# Patient Record
Sex: FEMALE | Race: WHITE | NOT HISPANIC OR LATINO | Employment: FULL TIME | URBAN - METROPOLITAN AREA
[De-identification: names, ages, dates, MRNs, and addresses within clinical notes are randomized per-mention and may not be internally consistent; named-entity substitution may affect disease eponyms.]

---

## 2018-09-10 ENCOUNTER — APPOINTMENT (OUTPATIENT)
Dept: WOMENS IMAGING | Facility: HOSPITAL | Age: 53
End: 2018-09-10

## 2018-09-10 PROCEDURE — 77067 SCR MAMMO BI INCL CAD: CPT | Performed by: RADIOLOGY

## 2020-12-10 ENCOUNTER — APPOINTMENT (OUTPATIENT)
Dept: WOMENS IMAGING | Facility: HOSPITAL | Age: 55
End: 2020-12-10

## 2020-12-10 PROCEDURE — 77067 SCR MAMMO BI INCL CAD: CPT | Performed by: RADIOLOGY

## 2021-12-14 ENCOUNTER — APPOINTMENT (OUTPATIENT)
Dept: WOMENS IMAGING | Facility: HOSPITAL | Age: 56
End: 2021-12-14

## 2021-12-14 PROCEDURE — 77067 SCR MAMMO BI INCL CAD: CPT | Performed by: RADIOLOGY

## 2022-10-05 ENCOUNTER — TRANSCRIBE ORDERS (OUTPATIENT)
Dept: CT IMAGING | Facility: CLINIC | Age: 57
End: 2022-10-05

## 2022-10-05 DIAGNOSIS — Z12.31 ENCOUNTER FOR SCREENING MAMMOGRAM FOR MALIGNANT NEOPLASM OF BREAST: Primary | ICD-10-CM

## 2022-12-16 ENCOUNTER — APPOINTMENT (OUTPATIENT)
Dept: WOMENS IMAGING | Facility: HOSPITAL | Age: 57
End: 2022-12-16

## 2022-12-16 PROCEDURE — 77067 SCR MAMMO BI INCL CAD: CPT | Performed by: RADIOLOGY

## 2022-12-30 RX ORDER — HYDROCHLOROTHIAZIDE 25 MG/1
25 TABLET ORAL EVERY MORNING
COMMUNITY
Start: 2022-10-17

## 2022-12-30 RX ORDER — LOSARTAN POTASSIUM 25 MG/1
TABLET ORAL
COMMUNITY
Start: 2022-12-02

## 2022-12-30 RX ORDER — MULTIPLE VITAMINS W/ MINERALS TAB 9MG-400MCG
1 TAB ORAL DAILY
COMMUNITY

## 2023-01-03 ENCOUNTER — OFFICE VISIT (OUTPATIENT)
Dept: BARIATRICS/WEIGHT MGMT | Facility: CLINIC | Age: 58
End: 2023-01-03
Payer: COMMERCIAL

## 2023-01-03 VITALS
HEART RATE: 76 BPM | WEIGHT: 234 LBS | DIASTOLIC BLOOD PRESSURE: 64 MMHG | HEIGHT: 66 IN | BODY MASS INDEX: 37.61 KG/M2 | OXYGEN SATURATION: 99 % | SYSTOLIC BLOOD PRESSURE: 124 MMHG

## 2023-01-03 DIAGNOSIS — F32.A DEPRESSIVE DISORDER: ICD-10-CM

## 2023-01-03 DIAGNOSIS — Z83.3 FAMILY HISTORY OF DIABETES MELLITUS IN SISTER: ICD-10-CM

## 2023-01-03 DIAGNOSIS — K21.9 GASTROESOPHAGEAL REFLUX DISEASE WITHOUT ESOPHAGITIS: ICD-10-CM

## 2023-01-03 DIAGNOSIS — K59.09 OTHER CONSTIPATION: ICD-10-CM

## 2023-01-03 DIAGNOSIS — Z51.81 ENCOUNTER FOR THERAPEUTIC DRUG LEVEL MONITORING: ICD-10-CM

## 2023-01-03 DIAGNOSIS — I10 PRIMARY HYPERTENSION: ICD-10-CM

## 2023-01-03 DIAGNOSIS — R73.03 PRE-DIABETES: ICD-10-CM

## 2023-01-03 DIAGNOSIS — G47.09 OTHER INSOMNIA: ICD-10-CM

## 2023-01-03 DIAGNOSIS — E66.01 CLASS 2 SEVERE OBESITY DUE TO EXCESS CALORIES WITH SERIOUS COMORBIDITY AND BODY MASS INDEX (BMI) OF 37.0 TO 37.9 IN ADULT: Primary | ICD-10-CM

## 2023-01-03 DIAGNOSIS — R53.83 OTHER FATIGUE: ICD-10-CM

## 2023-01-03 DIAGNOSIS — M19.90 ARTHRITIS: ICD-10-CM

## 2023-01-03 PROBLEM — E66.812 CLASS 2 SEVERE OBESITY DUE TO EXCESS CALORIES WITH SERIOUS COMORBIDITY AND BODY MASS INDEX (BMI) OF 37.0 TO 37.9 IN ADULT: Status: ACTIVE | Noted: 2023-01-03

## 2023-01-03 PROBLEM — Z72.0 TOBACCO USE: Status: ACTIVE | Noted: 2023-01-03

## 2023-01-03 PROBLEM — F41.9 ANXIETY: Status: ACTIVE | Noted: 2022-02-28

## 2023-01-03 PROCEDURE — 99205 OFFICE O/P NEW HI 60 MIN: CPT | Performed by: NURSE PRACTITIONER

## 2023-01-03 RX ORDER — PSYLLIUM SEED (WITH DEXTROSE)
1 POWDER (GRAM) ORAL DAILY
Qty: 283 G | Refills: 0
Start: 2023-01-03

## 2023-01-03 RX ORDER — CHLORAL HYDRATE 500 MG
1000 CAPSULE ORAL 2 TIMES DAILY WITH MEALS
Start: 2023-01-03

## 2023-01-03 RX ORDER — LANSOPRAZOLE 15 MG/1
15 CAPSULE, DELAYED RELEASE ORAL AS NEEDED
COMMUNITY

## 2023-01-03 NOTE — ASSESSMENT & PLAN NOTE
Worsening with weight gain. Taking prevacid and omeprazole most days of the week. Weight reduction of 5-10% should help. Continue to avoid trigger foods.

## 2023-01-03 NOTE — ASSESSMENT & PLAN NOTE
Patient's depression is recurrent and is moderate without psychosis. Their depression is currently active and the condition is newly identified. This will be reassessed at the next regular appointment. F/U as described:patient declines anti-depressant at this time.  Consider wellbutrin or prozac, tolerated well in the past and they may also help with weight reduction and appetite suppression.

## 2023-01-03 NOTE — ASSESSMENT & PLAN NOTE
Patient's (Body mass index is 37.77 kg/m².) indicates that they are morbidly/severely obese (BMI > 40 or > 35 with obesity - related health condition) with health conditions that include hypertension, GERD, osteoarthritis and pre-diabetes . Weight is improving with treatment. BMI is is above average; BMI management plan is completed. We discussed low calorie, low carb based diet program, portion control, increasing exercise, management of depression/anxiety/stress to control compensatory eating and an ligia-based approach such as Netaplan Pal or Lose It.    Topics of discussion included obesity as a disease, nutritional education on food groups, exercise, and medications. Patient was instructed in adequate protein, controlled carb and controlled fat intake.   Patient received instructions on using the medicines as a tool in controlling their weight with nutritional and behavioral changes. Risks and benefits were discussed. I believe the potential benefits of medication helping to decrease weight outweighs the risks. Patient is to try nutritonal/behavioral changes only first.   Patient received our clinic education booklet.   Our patient consent form was reviewed including potential risks of weight loss. We also reviewed our confidentiality and HIPPA statements. Patients current FITT score was reviewed along with current capability for exercise tolerance and a patient will work towards a FITT score of:     Frequency   Intensity Time Strength Training   []   0 None  []   0 None  []   0 None  []   0 None    []   1 (1-2x/week) []   1 (light) []   1 (<10 min) []   1 (1x/week)   [x]   2 (3-5x/week) [x]   2 (moderate) []   2 (10-20 min) [x]   2 (2x/week)   []   3 (daily)   []   3 (moderately hard)  []   4 (very hard) []   3 (20-30 min)  [x]   4 (>30 min) []   3 (3-4x/week)       Patient's past medical history was reviewed in detail and barriers to weight loss were identified and discussed. Past efforts at weight reduction  on their own as well as under physician supervision were documented and discussed.  I advised patient to continue routine care with their Primary Care Provider.     Nutritional recommendations and goals were reviewed including Calories:8847-0585 daily adjusted for exercise calories burnt, Protein: g daily, Net carbs (total carb - fiber) of 50-75g per day.  Start to keep a food journal and bring into next visit in 2 weeks for review. Practice the behavioral modification technique of mindful eating. Take one MVI daily and 2000mg fish oil daily. Take other medications and supplements as directed.  Start metamucil before meals.   Complete labwork.   Check AOM coverage (has state insurance so injectables saxenda and wegovy are likely affordable). Candidate for all meds, however she did not have successful weight loss with contrave in the past. Only took phentermine for 30 days for plateau.   1st treatment goal 175.

## 2023-01-03 NOTE — ASSESSMENT & PLAN NOTE
Menopause related. Difficulty getting to sleep or waking up after 4 hours. Added a nightly bath and that has helped. +snoring. Consider topamax in the future for sleep and weight loss.

## 2023-01-03 NOTE — ASSESSMENT & PLAN NOTE
Hypertension is stable with losartan and hctz.  Continue current treatment regimen.  Weight loss.  Regular aerobic exercise.  Continue current medications., managed by PCP.  Blood pressure will be reassessed at the next regular appointment.

## 2023-01-03 NOTE — PROGRESS NOTES
[facility]  [address]  [place] [address]   Office Note      Date: 2023  Patient Name: Dana Jackson  MRN: [id number]  : 1965  Subjective  Subjective     Chief Complaint  Obesity Management consult, nutrition education          Dana Jackson presents to [facility] for obesity management. She is a self-referral. Her recent diet attempts have not resulted in weight loss and she is seeking help. Her blood presssure has been increasing and she is now on two blood pressure medications that she desires to discontinue after losing weight. She is concerned that menopause is preventing her from losing weight. She is concerned about her increasing abdominal girth.   Weight history:  Goal weight 145-155lb, feels good there. Was always able to get back there until the last 2 years. Attributes recent weight gain to stopping hormone replacement therapy and stress with the loss of her father in  and her husbands cancer diagnosis in . Took contrave in  for <3 months, did not get results. Took phentemrine for 30 days for a weight plateau, can't recall any side effects. 2017 diagnosed with pre-diabetes, was started on weight watchers and was successful at losing weight.    Highest lifetime weight: 234 pounds. Today's weight is 106 kg (234 lb) pounds.   Weight 5 years ago: 220  The following seem to sabotage weight loss efforts:Lack of time for planning & self, social events, mindless eating (snacking while working or watching TV) and eating too fast  Pertinent medical history:  Total hysterectomy at age 33 after then birth of her daughter. She is now going through menopause, stopped hormone replacement in the last couple of years, BP went up and started hctz about a year ago. Her weight started to increase, she became irritable. PCP advised her to go back to weight watchers and is doing the same thing she did  previously and it didn't work. Then was started on losartan.  Weight gain has been in the midsection.  Sleep quality has declined since menopausal symptoms- difficulty getting to sleep or waking up after 4 hours. Added a nightly bath and that has helped. Has noticed difficulty getting up from the floor over then last 6 months.   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  passed away 20 years ago, originally tried prozac, no problems. Started wellbutrin and stopped a year ago. Did not tolerate cymbalta or zoloft.    Arthritis: right elbow, right knee. Was a runner, now is a walker. No exercise limitations other than high impact exercise. Occasionally takes naprosyn.   She has unifocal PVCs. No history of chest pain.    Family hisotry  Sister has type 2 insulin dependent diabetes, diagnosed 10 years ago. Also has obesity  Mother has obesity, maternal grandmother also had diabetes (had gastric bypass surgery)  Father had heart problems, had MI at age in early 50s. Had defibrillator/pacemaker placed. Had CHF on oxygen also. Passed away of COVID-19.   Review of Systems   Constitutional: Positive for fatigue and unexpected weight gain.        Positive for weight gain   HENT: Negative for trouble swallowing.         Negative for throat swelling   Respiratory: Negative for shortness of breath and wheezing.         Negative for snoring   Cardiovascular: Positive for leg swelling. Negative for chest pain and palpitations.   Gastrointestinal: Positive for abdominal pain, GERD and indigestion. Negative for constipation, diarrhea and nausea.   Endocrine: Negative for cold intolerance, heat intolerance, polydipsia, polyphagia and polyuria.        Negative for loss of hair  Negative for hirsutism     Genitourinary:        Denies menstrual irregularities   Musculoskeletal: Positive for arthralgias.        Denies exercise limitations  Denies chronic pain   Skin: Positive for dry skin.        Negative for  acne   Neurological: Negative for headache and memory problem.        Negative for paresthesias   Psychiatric/Behavioral: Positive for depressed mood. Negative for self-injury, sleep disturbance and suicidal ideas. The patient is not nervous/anxious.        PHQ-9 Total Score: 17           Objective   Body mass index is 37.77 kg/m².  Body composition analysis completed and showed:   %body fat: 50.1    Measurements (in inches)  Neck: 14.5  Chest: 45.5  Waist: 46  Hips: 51.5  Thighs: 37    Vital Signs:   /64   Pulse 76   Ht 167.6 cm (66\")   Wt 106 kg (234 lb)   SpO2 99%   BMI 37.77 kg/m²     Physical Exam   General appears stated age and normal appearance   HEENT PERRLA, EOM intact, conjunctivae normal and without thyromegaly or thyroid nodules   Chest/lungs Normal rate, Regular rhythm, Breathing is unlabored and Clear to auscultation bilaterally   Abdomen Soft, normal bowel sounds, without mass or tenderness and Central adiposity   Extremities without edema   Neuro Good historian and No focal deficit   Skin Acanthosis nigricans and Warm, dry, intact   Psych normal behavior, normal thought content and normal concentration     Result Review :                   Assessment / Plan       Diagnoses and all orders for this visit:    1. Class 2 severe obesity due to excess calories with serious comorbidity and body mass index (BMI) of 37.0 to 37.9 in adult (HCC) (Primary)  Assessment & Plan:  Patient's (Body mass index is 37.77 kg/m².) indicates that they are morbidly/severely obese (BMI > 40 or > 35 with obesity - related health condition) with health conditions that include hypertension, GERD, osteoarthritis and pre-diabetes . Weight is improving with treatment. BMI is is above average; BMI management plan is completed. We discussed low calorie, low carb based diet program, portion control, increasing exercise, management of depression/anxiety/stress to control compensatory eating and an ligia-based approach such as  MyFitness Pal or Lose It.    Topics of discussion included obesity as a disease, nutritional education on food groups, exercise, and medications. Patient was instructed in adequate protein, controlled carb and controlled fat intake.   Patient received instructions on using the medicines as a tool in controlling their weight with nutritional and behavioral changes. Risks and benefits were discussed. I believe the potential benefits of medication helping to decrease weight outweighs the risks. Patient is to try nutritonal/behavioral changes only first.   Patient received our clinic education booklet.   Our patient consent form was reviewed including potential risks of weight loss. We also reviewed our confidentiality and HIPPA statements. Patients current FITT score was reviewed along with current capability for exercise tolerance and a patient will work towards a FITT score of:     Frequency   Intensity Time Strength Training   []   0 None  []   0 None  []   0 None  []   0 None    []   1 (1-2x/week) []   1 (light) []   1 (<10 min) []   1 (1x/week)   [x]   2 (3-5x/week) [x]   2 (moderate) []   2 (10-20 min) [x]   2 (2x/week)   []   3 (daily)   []   3 (moderately hard)  []   4 (very hard) []   3 (20-30 min)  [x]   4 (>30 min) []   3 (3-4x/week)       Patient's past medical history was reviewed in detail and barriers to weight loss were identified and discussed. Past efforts at weight reduction on their own as well as under physician supervision were documented and discussed.  I advised patient to continue routine care with their Primary Care Provider.     Nutritional recommendations and goals were reviewed including Calories:1327-0024 daily adjusted for exercise calories burnt, Protein: g daily, Net carbs (total carb - fiber) of 50-75g per day.  Start to keep a food journal and bring into next visit in 2 weeks for review. Practice the behavioral modification technique of mindful eating. Take one MVI daily and  2000mg fish oil daily. Take other medications and supplements as directed.  Start metamucil before meals.   Complete labwork.   Check AOM coverage (has state insurance so injectables saxenda and wegovy are likely affordable). Candidate for all meds, however she did not have successful weight loss with contrave in the past. Only took phentermine for 30 days for plateau.   1st treatment goal 175.       Orders:  -     Psyllium (Metamucil) 28.3 % powder; Take 1 g by mouth Daily.  Dispense: 283 g; Refill: 0  -     Omega-3 Fatty Acids (fish oil) 1000 MG capsule capsule; Take 1 capsule by mouth 2 (Two) Times a Day With Meals.  -     Insulin, Total  -     Comprehensive Metabolic Panel  -     Hemoglobin A1c  -     Lipid Panel  -     TSH  -     Calcitriol (1,25 di-OH Vitamin D)    2. Gastroesophageal reflux disease without esophagitis  Assessment & Plan:  Worsening with weight gain. Taking prevacid and omeprazole most days of the week. Weight reduction of 5-10% should help. Continue to avoid trigger foods.       3. Primary hypertension  Assessment & Plan:  Hypertension is stable with losartan and hctz.  Continue current treatment regimen.  Weight loss.  Regular aerobic exercise.  Continue current medications., managed by PCP.  Blood pressure will be reassessed at the next regular appointment.      4. Pre-diabetes  Assessment & Plan:  Diagnosed in 2017. Check A1c today. Sister has type 2 diabetes.      5. Depressive disorder  Assessment & Plan:  Patient's depression is recurrent and is moderate without psychosis. Their depression is currently active and the condition is newly identified. This will be reassessed at the next regular appointment. F/U as described:patient declines anti-depressant at this time.  Consider wellbutrin or prozac, tolerated well in the past and they may also help with weight reduction and appetite suppression.      6. Arthritis  Assessment & Plan:  Weight reduction should help. Increase physical  activity.       7. Other constipation  Assessment & Plan:  Increase water, fiber exercise. May be related to calcium supplement. Add metamucil.       8. Family history of diabetes mellitus in sister  -     Insulin, Total  -     Comprehensive Metabolic Panel  -     Hemoglobin A1c    9. Other fatigue  -     TSH  -     Calcitriol (1,25 di-OH Vitamin D)    10. Encounter for therapeutic drug level monitoring  -     Urine Drug Screen - Urine, Clean Catch    11. Other insomnia  Assessment & Plan:  Menopause related. Difficulty getting to sleep or waking up after 4 hours. Added a nightly bath and that has helped. +snoring. Consider topamax in the future for sleep and weight loss.          I spent 70 minutes caring for Dana on this date of service. This time includes time spent by me in the following activities:preparing for the visit, obtaining and/or reviewing a separately obtained history, performing a medically appropriate examination and/or evaluation , counseling and educating the patient/family/caregiver, ordering medications, tests, or procedures and documenting information in the medical record  Follow Up   Return in about 2 weeks (around 1/17/2023) for Next scheduled follow up, Labs this visit.  Patient was given instructions and counseling regarding her condition or for health maintenance advice. Please see specific information pulled into the AVS if appropriate.     Radha Lezama, NAVYA  01/03/2023

## 2023-01-03 NOTE — PROGRESS NOTES
[facility]  [address]  [place] [address]   Office Note      Date: 2023  Patient Name: Dana Jackson  MRN: [id number]  : 1965  Subjective  Subjective     Chief Complaint  Obesity Management follow-up     {Problem List  Visit Diagnosis   Encounters  Notes  Medications  Labs  Result Review Imaging  Media :23}     Dana Jackson presents to [facility] for obesity management.    Highest lifetime weight: 223 pounds. Today's weight is   pounds.   Weight 5 years ago: 220  The patient is exercising with a FITT score of:    The following seem to sabotage weight loss efforts:{WeightLossInst:33749}    Review of Systems   Constitutional: Positive for fatigue and unexpected weight gain.        Positive for weight gain   HENT: Negative for trouble swallowing.         Negative for throat swelling   Respiratory: Negative for shortness of breath and wheezing.         Negative for snoring   Cardiovascular: Positive for leg swelling. Negative for chest pain and palpitations.   Gastrointestinal: Positive for abdominal pain, GERD and indigestion. Negative for constipation and diarrhea.   Endocrine: Negative for cold intolerance, heat intolerance, polydipsia, polyphagia and polyuria.        Negative for loss of hair  Negative for hirsutism     Genitourinary:        Denies menstrual irregularities   Musculoskeletal: Positive for arthralgias.        Denies exercise limitations  Denies chronic pain   Skin: Positive for dry skin.        Negative for acne   Neurological: Negative for headache and memory problem.        Negative for paresthesias   Psychiatric/Behavioral: Positive for depressed mood. Negative for self-injury, sleep disturbance and suicidal ideas. The patient is not nervous/anxious.        PHQ-9 Total Score: 17       Objective   There is no height or weight on file to calculate BMI.  Body composition analysis  completed and showed:   %body fat: ***    Measurements (in inches)  Neck: 14.5  Chest: 45.5  Waist: 46  Hips: 51.5  Thighs: 37    Vital Signs:   /64   Pulse 76   SpO2 99%     Physical Exam   General {NewPTPEGen:43801::\"appears stated age\",\"normal appearance\"}   HEENT {NewPTPEHEENT:25273::\"PERRLA\",\"EOM intact\",\"conjunctivae normal\"}   Chest/lungs {NewPTPECARDIO/PULM:10348::\"Normal rate\",\"Regular rhythm\",\"Breathing is unlabored\",\"Clear to auscultation bilaterally\"}   Abdomen {NewPTPEAbdomen:71151::\"Soft, normal bowel sounds, without mass or tenderness\"}   Extremities {NewPTPEExt:90420::\"without edema\"}   Neuro {NewPTPENeuro:69502::\"Normal DTRs\",\"Good historian\",\"No focal deficit\"}   Skin {NewPtPESkin:72889::\"Warm, dry, intact\"}   Psych {NewPTPEPsych:91751::\"normal behavior\",\"normal thought content\",\"normal concentration\"}     Result Review :{Labs  Result Review  Imaging  Med Tab  Media :23}   {The following data was reviewed by (Optional):38613}  {Ambulatory Labs (Optional):51215}  {Data reviewed (Optional):50649:::1}            Assessment / Plan    {CC Problem List  Visit Diagnosis  ROS  Review (Popup)  Health Maintenance  Quality  BestPractice  Medications  SmartSets  SnapShot Encounters  Media :23}   There are no diagnoses linked to this encounter.     {Time Spent (Optional):88288}  Follow Up {Instructions Charge Capture  Follow-up Communications :23}  No follow-ups on file.  Patient was given instructions and counseling regarding her condition or for health maintenance advice. Please see specific information pulled into the AVS if appropriate.     Radha Lezama, APRN  01/03/2023

## 2023-01-05 LAB
1,25(OH)2D SERPL-MCNC: 65.5 PG/ML (ref 24.8–81.5)
ALBUMIN SERPL-MCNC: 4.3 G/DL (ref 3.8–4.9)
ALBUMIN/GLOB SERPL: 1.9 {RATIO} (ref 1.2–2.2)
ALP SERPL-CCNC: 67 IU/L (ref 44–121)
ALT SERPL-CCNC: 48 IU/L (ref 0–32)
AMPHETAMINES UR QL SCN: NEGATIVE NG/ML
AST SERPL-CCNC: 34 IU/L (ref 0–40)
BARBITURATES UR QL SCN: NEGATIVE NG/ML
BENZODIAZ UR QL SCN: NEGATIVE NG/ML
BILIRUB SERPL-MCNC: 0.5 MG/DL (ref 0–1.2)
BUN SERPL-MCNC: 17 MG/DL (ref 6–24)
BUN/CREAT SERPL: 25 (ref 9–23)
BZE UR QL SCN: NEGATIVE NG/ML
CALCIUM SERPL-MCNC: 9.1 MG/DL (ref 8.7–10.2)
CANNABINOIDS UR QL SCN: NEGATIVE NG/ML
CHLORIDE SERPL-SCNC: 107 MMOL/L (ref 96–106)
CHOLEST SERPL-MCNC: 169 MG/DL (ref 100–199)
CO2 SERPL-SCNC: 23 MMOL/L (ref 20–29)
CREAT SERPL-MCNC: 0.69 MG/DL (ref 0.57–1)
CREAT UR-MCNC: 77.1 MG/DL (ref 20–300)
EGFRCR SERPLBLD CKD-EPI 2021: 102 ML/MIN/1.73
GLOBULIN SER CALC-MCNC: 2.3 G/DL (ref 1.5–4.5)
GLUCOSE SERPL-MCNC: 97 MG/DL (ref 70–99)
HBA1C MFR BLD: 5.7 % (ref 4.8–5.6)
HDLC SERPL-MCNC: 55 MG/DL
INSULIN SERPL-ACNC: 27.4 UIU/ML (ref 2.6–24.9)
LABORATORY COMMENT REPORT: NORMAL
LDLC SERPL CALC-MCNC: 103 MG/DL (ref 0–99)
METHADONE UR QL SCN: NEGATIVE NG/ML
OPIATES UR QL SCN: NEGATIVE NG/ML
OXYCODONE+OXYMORPHONE UR QL SCN: NEGATIVE NG/ML
PCP UR QL: NEGATIVE NG/ML
PH UR: 5.8 [PH] (ref 4.5–8.9)
POTASSIUM SERPL-SCNC: 4.6 MMOL/L (ref 3.5–5.2)
PROPOXYPH UR QL SCN: NEGATIVE NG/ML
PROT SERPL-MCNC: 6.6 G/DL (ref 6–8.5)
SODIUM SERPL-SCNC: 143 MMOL/L (ref 134–144)
TRIGL SERPL-MCNC: 55 MG/DL (ref 0–149)
TSH SERPL DL<=0.005 MIU/L-ACNC: 1.11 UIU/ML (ref 0.45–4.5)
VLDLC SERPL CALC-MCNC: 11 MG/DL (ref 5–40)

## 2023-01-12 ENCOUNTER — OFFICE VISIT (OUTPATIENT)
Dept: BARIATRICS/WEIGHT MGMT | Facility: CLINIC | Age: 58
End: 2023-01-12
Payer: COMMERCIAL

## 2023-01-12 VITALS
WEIGHT: 227 LBS | HEIGHT: 66 IN | BODY MASS INDEX: 36.48 KG/M2 | DIASTOLIC BLOOD PRESSURE: 84 MMHG | SYSTOLIC BLOOD PRESSURE: 120 MMHG | HEART RATE: 76 BPM | OXYGEN SATURATION: 100 %

## 2023-01-12 DIAGNOSIS — R73.03 PRE-DIABETES: ICD-10-CM

## 2023-01-12 DIAGNOSIS — E78.49 OTHER HYPERLIPIDEMIA: ICD-10-CM

## 2023-01-12 DIAGNOSIS — R11.0 NAUSEA: ICD-10-CM

## 2023-01-12 DIAGNOSIS — E66.01 CLASS 2 SEVERE OBESITY DUE TO EXCESS CALORIES WITH SERIOUS COMORBIDITY AND BODY MASS INDEX (BMI) OF 36.0 TO 36.9 IN ADULT: Primary | ICD-10-CM

## 2023-01-12 DIAGNOSIS — M72.2 PLANTAR FASCIITIS: ICD-10-CM

## 2023-01-12 PROBLEM — R74.01 ALT (SGPT) LEVEL RAISED: Status: ACTIVE | Noted: 2023-01-12

## 2023-01-12 PROCEDURE — 99215 OFFICE O/P EST HI 40 MIN: CPT | Performed by: NURSE PRACTITIONER

## 2023-01-12 PROCEDURE — 96372 THER/PROPH/DIAG INJ SC/IM: CPT | Performed by: NURSE PRACTITIONER

## 2023-01-12 RX ORDER — ONDANSETRON 4 MG/1
4 TABLET, ORALLY DISINTEGRATING ORAL EVERY 8 HOURS PRN
Qty: 14 TABLET | Refills: 0 | Status: SHIPPED | OUTPATIENT
Start: 2023-01-12 | End: 2023-03-23 | Stop reason: SDUPTHER

## 2023-01-12 RX ORDER — SEMAGLUTIDE 0.5 MG/.5ML
0.5 INJECTION, SOLUTION SUBCUTANEOUS WEEKLY
Qty: 2 ML | Refills: 0 | Status: SHIPPED | OUTPATIENT
Start: 2023-01-12 | End: 2023-02-13 | Stop reason: DRUGHIGH

## 2023-01-12 NOTE — ASSESSMENT & PLAN NOTE
Flared with treadmill, has switched to non weight bearing exercises. Weight reduction should help.

## 2023-01-12 NOTE — PROGRESS NOTES
"  [facility]  [address]  [place] [address]     Office Note      Date: 2023  Patient Name: Dana Jackson  MRN: [id number]  : 1965  Subjective  Subjective     Chief Complaint  Obesity Management follow-up          Dana Jackson presents to [facility] for obesity management. This is her initial 2 week follow up. She is working on the following lifestyle changes: \"everything you told me to do\". Keeping a food journal, reading nutrition labels and increasing protein, cooking leaner foods at home, increasing exercise. Patient is unsure with weight loss progress. Appetite is moderately controlled. Reports frequent BM when first starting psyllium husk but she is now tolerating it well- one scoop once a day. The patient is taking multivitamin and is taking fish oil.  The patient is using a food journal. Is eating more protein- protein shakes, salmon, chicken, eggs. The patient is exercising, plantar fasciitis flared up after 3 days on the treadmill then switched to yoga and cycling with a FITT score of:    Frequency Intensity Time Strength Training   []   0, none []   0 []   0 []   0   []   1 (1-2x/week) [x]   1 (light) []   1 (<10 min) []   1 (1x/week)   [x]   2 (3-5x/week) [x]   2 (moderate) []   2 (10-20 min) [x]   2 (2x/week)   []   3 (daily) []   3 (moderately hard)  []   4 (very hard) []   3 (20-30 min)  []   4 (>30 min) []   3 (3-4x/week)     Review of Systems   Constitutional: Negative for appetite change and fatigue.   Eyes: Negative for blurred vision, double vision and visual disturbance.   Cardiovascular: Negative for chest pain and palpitations.   Gastrointestinal: Positive for abdominal pain. Negative for constipation, diarrhea, nausea, vomiting and GERD.   Endocrine: Negative for polydipsia, polyphagia and polyuria.   Musculoskeletal: Negative for arthralgias, back pain and myalgias.   Neurological: " "Negative for dizziness, tremors, light-headedness, headache and memory problem.        Parasthesias negative   Psychiatric/Behavioral: Positive for sleep disturbance. Negative for depressed mood and stress. The patient is not nervous/anxious.        Objective   Start weight: 234 pounds.    Total Loss lb/%Loss of beginning body weight (BBW): -7lb/-2.99%  Change in weight since last visit: -7    Body mass index is 36.64 kg/m².   Body composition analysis completed and showed:   %body fat: 51.2  Measurements (in inches)  Waist: 46    Vital Signs:   /84   Pulse 76   Ht 167.6 cm (66\")   Wt 103 kg (227 lb)   SpO2 100%   BMI 36.64 kg/m²     Physical Exam   General appears stated age and normal appearance   HEENT PERRLA, EOM intact and conjunctivae normal   Chest/lungs Normal rate, Regular rhythm and Breathing is unlabored   Extremities without edema   Neuro Good historian and No focal deficit   Skin Warm, dry, intact   Psych normal behavior, normal thought content and normal concentration     Result Review :   The following data was reviewed by: NAVYA Varma on 01/12/2023:  Fasting insulin indicates insulin resistance. A1c is also in prediabetic range.   ALT slightly elevated, likely fatty liver, CMP otherwise unremarkable.  LDL cholesterol slightly elevated at 103. Weight loss should help.   TSH thyroid normal and ideal.    Vitamin D normal and ideal at 65.5.  Common labs    Common Labs 1/3/23 1/3/23 1/3/23    1019 1019 1019   Glucose 97     BUN 17     Creatinine 0.69     Sodium 143     Potassium 4.6     Chloride 107 (A)     Calcium 9.1     Total Protein 6.6     Albumin 4.3     Total Bilirubin 0.5     Alkaline Phosphatase 67     AST (SGOT) 34     ALT (SGPT) 48 (A)     Total Cholesterol   169   Triglycerides   55   HDL Cholesterol   55   LDL Cholesterol    103 (A)   Hemoglobin A1C  5.7 (A)    (A) Abnormal value       Comments are available for some flowsheets but are not being displayed.                "     Assessment / Plan        Diagnoses and all orders for this visit:    1. Class 2 severe obesity due to excess calories with serious comorbidity and body mass index (BMI) of 36.0 to 36.9 in adult (HCC) (Primary)  Assessment & Plan:  Patient's (Body mass index is 36.64 kg/m².) indicates that they are morbidly/severely obese (BMI > 40 or > 35 with obesity - related health condition) with health conditions that include hypertension, dyslipidemias, GERD and pre-diabetes . Weight is improving with lifestyle modifications. BMI is is above average; BMI management plan is completed. We discussed low calorie, low carb based diet program, portion control, increasing exercise, joining a fitness center or start home based exercise program, pharmacologic options including wegovy, psyllium husk and an ligia-based approach such as [employer] Pal or Lose It.    I have instructed the patient to continue with pursuit of medical weight loss as a part of this program. Patient does meet criteria for use of anorectics at this time as BMI >30  and is not at treatment goal.     Continue nutritional focus and work towards new exercise FITT goal of: 2-2-4-2. Continue to increase as tolerated.   The current plan for this month includes: continue to work on lifestyle behavioral changes and continue nutrition focus with daily food journal. Going on a cruise, has consulted with the ship director to ensure she can stay on low carb diet on her trip. Goal 6-8lb.   Start Wegovy. Denies family or personal history of pancreatitis, MTC, or MEN 2. Discussed common side effects of nausea, diarrhea, vomiting, constipation, stomach pain, headache, fatigue, upset stomach, dizziness, feeling bloated, belching, gas, stomach flu, heartburn.   First injection of Wegovy 0.25mg was administered in the office today LUQ, no complications. Sample pen provided to patient along with content regarding use of the pen, dosing schedule, savings opportunities, and helpful  tips.   Treatment goal 175lb.         Orders:  -     Semaglutide-Weight Management 0.25 MG/0.5ML solution auto-injector; Inject 0.25 mg under the skin into the appropriate area as directed 1 (One) Time Per Week.  Dispense: 2 mL; Refill: 0  -     Semaglutide-Weight Management (Wegovy) 0.5 MG/0.5ML solution auto-injector; Inject 0.5 mg under the skin into the appropriate area as directed 1 (One) Time Per Week.  Dispense: 2 mL; Refill: 0    2. Pre-diabetes  Assessment & Plan:  A1c 5.7. Low carb diet, regular physical activity, and weight reduction of 10-15%. Start wegovy.         3. Other hyperlipidemia  Assessment & Plan:  Lipid abnormalities are mild.  Nutritional counseling was provided. and continue psyllium husk fiber  Lipids will be reassessed in 1 year.      4. Plantar fasciitis  Assessment & Plan:  Flared with treadmill, has switched to non weight bearing exercises. Weight reduction should help.       5. Nausea  -     ondansetron ODT (ZOFRAN-ODT) 4 MG disintegrating tablet; Place 1 tablet on the tongue Every 8 (Eight) Hours As Needed for Nausea or Vomiting.  Dispense: 14 tablet; Refill: 0      I spent 40 minutes caring for Dana on this date of service. This time includes time spent by me in the following activities:preparing for the visit, reviewing tests, obtaining and/or reviewing a separately obtained history, performing a medically appropriate examination and/or evaluation , counseling and educating the patient/family/caregiver, ordering medications, tests, or procedures, documenting information in the medical record and care coordination  Follow Up   Return in about 4 weeks (around 2/9/2023) for Next scheduled follow up.  Patient was given instructions and counseling regarding her condition or for health maintenance advice. Please see specific information pulled into the AVS if appropriate.     Radha Lezama, NAVYA  01/12/2023

## 2023-01-12 NOTE — ASSESSMENT & PLAN NOTE
A1c 5.7. Low carb diet, regular physical activity, and weight reduction of 10-15%. Start wegovy.

## 2023-01-12 NOTE — ASSESSMENT & PLAN NOTE
Lipid abnormalities are mild.  Nutritional counseling was provided. and continue psyllium husk fiber  Lipids will be reassessed in 1 year.

## 2023-01-12 NOTE — ASSESSMENT & PLAN NOTE
Patient's (Body mass index is 36.64 kg/m².) indicates that they are morbidly/severely obese (BMI > 40 or > 35 with obesity - related health condition) with health conditions that include hypertension, dyslipidemias, GERD and pre-diabetes . Weight is improving with lifestyle modifications. BMI is is above average; BMI management plan is completed. We discussed low calorie, low carb based diet program, portion control, increasing exercise, joining a fitness center or start home based exercise program, pharmacologic options including wegovy, psyllium husk and an ligia-based approach such as [employer] Pal or Lose It.    I have instructed the patient to continue with pursuit of medical weight loss as a part of this program. Patient does meet criteria for use of anorectics at this time as BMI >30  and is not at treatment goal.     Continue nutritional focus and work towards new exercise FITT goal of: 2-2-4-2. Continue to increase as tolerated.   The current plan for this month includes: continue to work on lifestyle behavioral changes and continue nutrition focus with daily food journal. Going on a cruise, has consulted with the ship director to ensure she can stay on low carb diet on her trip. Goal 6-8lb.   Start Wegovy. Denies family or personal history of pancreatitis, MTC, or MEN 2. Discussed common side effects of nausea, diarrhea, vomiting, constipation, stomach pain, headache, fatigue, upset stomach, dizziness, feeling bloated, belching, gas, stomach flu, heartburn.   First injection of Wegovy 0.25mg was administered in the office today LUQ, no complications. Sample pen provided to patient along with content regarding use of the pen, dosing schedule, savings opportunities, and helpful tips.   Treatment goal 175lb.

## 2023-01-16 ENCOUNTER — PRIOR AUTHORIZATION (OUTPATIENT)
Dept: BARIATRICS/WEIGHT MGMT | Facility: CLINIC | Age: 58
End: 2023-01-16
Payer: COMMERCIAL

## 2023-02-13 ENCOUNTER — OFFICE VISIT (OUTPATIENT)
Dept: BARIATRICS/WEIGHT MGMT | Facility: CLINIC | Age: 58
End: 2023-02-13
Payer: COMMERCIAL

## 2023-02-13 VITALS
BODY MASS INDEX: 35.36 KG/M2 | DIASTOLIC BLOOD PRESSURE: 68 MMHG | HEART RATE: 74 BPM | HEIGHT: 66 IN | OXYGEN SATURATION: 100 % | SYSTOLIC BLOOD PRESSURE: 128 MMHG | WEIGHT: 220 LBS

## 2023-02-13 DIAGNOSIS — E66.01 CLASS 2 SEVERE OBESITY DUE TO EXCESS CALORIES WITH SERIOUS COMORBIDITY AND BODY MASS INDEX (BMI) OF 35.0 TO 35.9 IN ADULT: Primary | ICD-10-CM

## 2023-02-13 DIAGNOSIS — R73.03 PRE-DIABETES: ICD-10-CM

## 2023-02-13 PROCEDURE — 99213 OFFICE O/P EST LOW 20 MIN: CPT | Performed by: NURSE PRACTITIONER

## 2023-02-13 RX ORDER — SEMAGLUTIDE 1 MG/.5ML
1 INJECTION, SOLUTION SUBCUTANEOUS WEEKLY
Qty: 2 ML | Refills: 0 | Status: SHIPPED | OUTPATIENT
Start: 2023-02-13

## 2023-02-13 RX ORDER — ESTRADIOL 0.04 MG/D
FILM, EXTENDED RELEASE TRANSDERMAL
COMMUNITY
Start: 2023-01-11

## 2023-02-13 NOTE — PROGRESS NOTES
"  [facility]  [address]  [place] [address]     Office Note      Date: 2023  Patient Name: Dana Jackson  MRN: [id number]  : 1965  Subjective  Subjective     Chief Complaint  Obesity Management follow-up          Dana Jackson presents to [facility] for obesity management.   Patient is satisfied with weight loss progress. Appetite is well controlled. Gets full fast, only eating about 1/2 a meal at a time, coming back to it after 1-2 hours. Prioritizing protein intake.  Reports no side effects of prescribed medications today. Had nausea after her third dose of wegovy but thinks it may have been related to her cruise/motion sickness. The patient is taking multivitamin and is taking fish oil.  Stlil taking colace and a couple pieces of grapefruit every day for constipation. The patient is using a food journal. Added a protein shake once a day. She is cooking healthier, trying new recipes, her  is eating healthier too. The patient is exercising \"chaotic\", doing physical therapy for plantar fasiitis, still in a splint. 15 minutes on bicycle or walking. with a FITT score of:    Frequency Intensity Time Strength Training   []   0, none []   0 []   0 []   0   []   1 (1-2x/week) [x]   1 (light) []   1 (<10 min) []   1 (1x/week)   [x]   2 (3-5x/week) [x]   2 (moderate) []   2 (10-20 min) [x]   2 (2x/week)   []   3 (daily) []   3 (moderately hard)  []   4 (very hard) [x]   3 (20-30 min)  []   4 (>30 min) []   3 (3-4x/week)     Review of Systems   Constitutional: Negative for appetite change and fatigue.   Eyes: Negative for blurred vision, double vision and visual disturbance.   Cardiovascular: Negative for chest pain and palpitations.   Gastrointestinal: Positive for nausea. Negative for abdominal pain, constipation, diarrhea, vomiting and GERD.   Endocrine: Negative for polydipsia, polyphagia and polyuria. " "  Musculoskeletal: Negative for arthralgias, back pain and myalgias.   Neurological: Negative for dizziness, tremors, light-headedness, headache and memory problem.        Parasthesias negative   Psychiatric/Behavioral: Negative for sleep disturbance, depressed mood and stress. The patient is not nervous/anxious.      Objective   Start weight: 234 pounds.    Total Loss lb/%Loss of beginning body weight (BBW): -14lb/-5.98%  Change in weight since last visit: -7    Body mass index is 35.51 kg/m².   Body composition analysis completed and showed:   %body fat: 49.9  Measurements (in inches)  Waist: 47    Vital Signs:   /68   Pulse 74   Ht 167.6 cm (66\")   Wt 99.8 kg (220 lb)   SpO2 100%   BMI 35.51 kg/m²     Physical Exam   General appears stated age and normal appearance   HEENT PERRLA, EOM intact and conjunctivae normal   Chest/lungs Normal rate, Regular rhythm and Breathing is unlabored   Extremities without edema   Neuro Good historian and No focal deficit   Skin Warm, dry, intact   Psych normal behavior, normal thought content and normal concentration     Result Review :                Assessment / Plan        Diagnoses and all orders for this visit:    1. Class 2 severe obesity due to excess calories with serious comorbidity and body mass index (BMI) of 35.0 to 35.9 in adult (HCC) (Primary)  Assessment & Plan:  Patient's (Body mass index is 35.51 kg/m².) indicates that they are morbidly/severely obese (BMI > 40 or > 35 with obesity - related health condition) with health conditions that include hypertension, dyslipidemias, GERD, osteoarthritis and pre-diabetes, plantarfasciitis, ^ALT . Weight is improving with treatment. BMI  is above average; BMI management plan is completed. We discussed low calorie, low carb based diet program, portion control, increasing exercise, pharmacologic options including wegovy and an ligia-based approach such as [employer] Pal or Lose It.    I have instructed the patient to " continue with pursuit of medical weight loss as a part of this program. Patient does meet criteria for use of anorectics at this time as BMI >30 , is not at treatment goal and this medication is indicated for LONG TERM use for management of obesity.     Continue nutritional focus and work towards new exercise FITT goal of: 2-2-4-2. As tolerated and as advised by PT/ortho.   The current plan for this month includes: continue to work on lifestyle behavioral changes and continue nutrition focus. Goal 6-8lb. Increase wegovy to 1 mg after completing the next 3 weeks of 0.5mg.        Orders:  -     Semaglutide-Weight Management (Wegovy) 1 MG/0.5ML solution auto-injector; Inject 1 mg under the skin into the appropriate area as directed 1 (One) Time Per Week.  Dispense: 2 mL; Refill: 0    2. Pre-diabetes  Assessment & Plan:  Stable. Denies hypoglycemia with diet changes and wegovy. Continue healthy lifestyle changes. Repeat labs in 3-6 months.           Follow Up   Return in about 4 weeks (around 3/13/2023) for Next scheduled follow up.  Patient was given instructions and counseling regarding her condition or for health maintenance advice. Please see specific information pulled into the AVS if appropriate.     Radha Lezama, NAVYA  02/13/2023

## 2023-02-13 NOTE — ASSESSMENT & PLAN NOTE
Patient's (Body mass index is 35.51 kg/m².) indicates that they are morbidly/severely obese (BMI > 40 or > 35 with obesity - related health condition) with health conditions that include hypertension, dyslipidemias, GERD, osteoarthritis and pre-diabetes, plantarfasciitis, ^ALT . Weight is improving with treatment. BMI  is above average; BMI management plan is completed. We discussed low calorie, low carb based diet program, portion control, increasing exercise, pharmacologic options including wegovy and an ligia-based approach such as [employer] Pal or Lose It.    I have instructed the patient to continue with pursuit of medical weight loss as a part of this program. Patient does meet criteria for use of anorectics at this time as BMI >30 , is not at treatment goal and this medication is indicated for LONG TERM use for management of obesity.     Continue nutritional focus and work towards new exercise FITT goal of: 2-2-4-2. As tolerated and as advised by PT/ortho.   The current plan for this month includes: continue to work on lifestyle behavioral changes and continue nutrition focus. Goal 6-8lb. Increase wegovy to 1 mg after completing the next 3 weeks of 0.5mg.

## 2023-02-13 NOTE — ASSESSMENT & PLAN NOTE
Stable. Denies hypoglycemia with diet changes and wegovy. Continue healthy lifestyle changes. Repeat labs in 3-6 months.

## 2023-03-23 ENCOUNTER — OFFICE VISIT (OUTPATIENT)
Dept: BARIATRICS/WEIGHT MGMT | Facility: CLINIC | Age: 58
End: 2023-03-23
Payer: COMMERCIAL

## 2023-03-23 VITALS
HEIGHT: 66 IN | BODY MASS INDEX: 33.91 KG/M2 | HEART RATE: 115 BPM | WEIGHT: 211 LBS | SYSTOLIC BLOOD PRESSURE: 140 MMHG | OXYGEN SATURATION: 96 % | DIASTOLIC BLOOD PRESSURE: 90 MMHG

## 2023-03-23 DIAGNOSIS — K21.9 GASTROESOPHAGEAL REFLUX DISEASE WITHOUT ESOPHAGITIS: ICD-10-CM

## 2023-03-23 DIAGNOSIS — R11.0 NAUSEA: ICD-10-CM

## 2023-03-23 DIAGNOSIS — E66.09 CLASS 1 OBESITY DUE TO EXCESS CALORIES WITH SERIOUS COMORBIDITY AND BODY MASS INDEX (BMI) OF 34.0 TO 34.9 IN ADULT: Primary | ICD-10-CM

## 2023-03-23 DIAGNOSIS — I10 PRIMARY HYPERTENSION: ICD-10-CM

## 2023-03-23 DIAGNOSIS — K59.09 OTHER CONSTIPATION: ICD-10-CM

## 2023-03-23 PROBLEM — E66.811 CLASS 1 OBESITY DUE TO EXCESS CALORIES WITH SERIOUS COMORBIDITY AND BODY MASS INDEX (BMI) OF 34.0 TO 34.9 IN ADULT: Status: ACTIVE | Noted: 2023-01-03

## 2023-03-23 PROCEDURE — 99213 OFFICE O/P EST LOW 20 MIN: CPT | Performed by: NURSE PRACTITIONER

## 2023-03-23 RX ORDER — ONDANSETRON 4 MG/1
4 TABLET, ORALLY DISINTEGRATING ORAL EVERY 8 HOURS PRN
Qty: 30 TABLET | Refills: 0 | Status: SHIPPED | OUTPATIENT
Start: 2023-03-23

## 2023-03-23 RX ORDER — BIFIDOBACTERIUM LONGUM SUBSP. INFANTIS, AVOBENZONE, HOMOSALATE, OCTISALATE, OCTOCRYLENE, AND OXYBENZONE
1 KIT DAILY
Qty: 30 TABLET | Refills: 0
Start: 2023-03-23

## 2023-03-23 RX ORDER — SEMAGLUTIDE 1.7 MG/.75ML
1.7 INJECTION, SOLUTION SUBCUTANEOUS WEEKLY
Qty: 3 ML | Refills: 2 | Status: SHIPPED | OUTPATIENT
Start: 2023-03-23

## 2023-03-23 NOTE — ASSESSMENT & PLAN NOTE
Patient's (Body mass index is 34.06 kg/m².) indicates that they are obese (BMI >30) with health conditions that include hypertension, dyslipidemias, GERD, osteoarthritis and pre-diabetes, ^ALT . Weight is improving with treatment. BMI  is above average; BMI management plan is completed. We discussed low calorie, low carb based diet program, portion control, increasing exercise, pharmacologic options including wegovy and an ligia-based approach such as [employer] Pal or Lose It.    I have instructed the patient to continue with pursuit of medical weight loss as a part of this program. Patient does meet criteria for use of anorectics at this time as BMI >30  and is not at treatment goal.     Continue nutritional focus and work towards new exercise FITT goal of: 2-2-4-2.   The current plan for this month includes: continue current exercise efforts and continue nutrition focus. Goal 6-8lb. Try probiotic for constipation. Continue to focus on protein and hydration. Try [employer]itastic food journal ligia, using [employer]. Just completed 2nd wegovy dose this morning, increase to 1.7mg when all 4 doses are complete. Treatment goal 145lb.

## 2023-03-23 NOTE — ASSESSMENT & PLAN NOTE
Hypertension is stable. Up today because she did her wegovy injection and she's nauseated.  Continue current treatment regimen.  Weight loss.  Regular aerobic exercise.  Continue current medications.  Blood pressure will be reassessed at the next regular appointment.

## 2023-03-23 NOTE — ASSESSMENT & PLAN NOTE
Improving, using prevacid and rolaids PRN. Knows food triggers and tries to avoid those. Continue with weight reduction.

## 2023-03-23 NOTE — PROGRESS NOTES
[facility]  [address]  [place] [address]     Office Note      Date: 2023  Patient Name: Dana Jackson  MRN: [id number]  : 1965  Subjective  Subjective     Chief Complaint  Obesity Management follow-up          Dana Jackson presents to [facility] for obesity management.   Patient is satisfied with weight loss progress. Appetite is moderately controlled. Reports constipation and nausea first 1-2 days after wegovy injection. Metamucil PRN, colace daily.  The patient is taking multivitamin and is taking fish oil. The patient is using a food journal, [employer]pal. Focused on protein and hydration.   The patient is exercising, still walking 15 on treadmill on 15 on bicycle, walks outside 45 minutes around the neighborhood, also does chair yoga. Had operation on her heel again, was non-weight bearing for 4 days. Not taking prilosec on a daily basis, most of the time rolaids takes care of it.     Frequency Intensity Time Strength Training   []   0, none []   0 []   0 []   0   []   1 (1-2x/week) []   1 (light) []   1 (<10 min) []   1 (1x/week)   [x]   2 (3-5x/week) [x]   2 (moderate) []   2 (10-20 min) [x]   2 (2x/week)   []   3 (daily) []   3 (moderately hard)  []   4 (very hard) [x]   3 (20-30 min)  []   4 (>30 min) []   3 (3-4x/week)     Review of Systems   Constitutional: Negative for appetite change and fatigue.   Eyes: Negative for blurred vision, double vision and visual disturbance.   Cardiovascular: Negative for chest pain and palpitations.   Gastrointestinal: Negative for abdominal pain, constipation, diarrhea, nausea, vomiting and GERD.   Endocrine: Negative for polydipsia, polyphagia and polyuria.   Musculoskeletal: Negative for arthralgias, back pain and myalgias.   Neurological: Negative for dizziness, tremors, light-headedness, headache and memory problem.        Parasthesias negative  "  Psychiatric/Behavioral: Negative for sleep disturbance, depressed mood and stress. The patient is not nervous/anxious.        Objective   Start weight: 234 pounds.    Total Loss lb/%Loss of beginning body weight (BBW): -23lb/-9.83%  Change in weight since last visit: -9    Body mass index is 34.06 kg/m².   Body composition analysis completed and showed:   %body fat: 49.3  Measurements (in inches)  Waist: 42    Vital Signs:   /90   Pulse 115   Ht 167.6 cm (66\")   Wt 95.7 kg (211 lb)   SpO2 96%   BMI 34.06 kg/m²     Physical Exam   General appears stated age and normal appearance   HEENT PERRLA, EOM intact and conjunctivae normal   Chest/lungs Normal rate, Regular rhythm and Breathing is unlabored   Extremities without edema   Neuro Good historian and No focal deficit   Skin Warm, dry, intact   Psych normal behavior, normal thought content and normal concentration     Result Review :                Assessment / Plan        Diagnoses and all orders for this visit:    1. Class 1 obesity due to excess calories with serious comorbidity and body mass index (BMI) of 34.0 to 34.9 in adult (Primary)  Assessment & Plan:  Patient's (Body mass index is 34.06 kg/m².) indicates that they are obese (BMI >30) with health conditions that include hypertension, dyslipidemias, GERD, osteoarthritis and pre-diabetes, ^ALT . Weight is improving with treatment. BMI  is above average; BMI management plan is completed. We discussed low calorie, low carb based diet program, portion control, increasing exercise, pharmacologic options including wegovy and an ligia-based approach such as [employer] Pal or Lose It.    I have instructed the patient to continue with pursuit of medical weight loss as a part of this program. Patient does meet criteria for use of anorectics at this time as BMI >30  and is not at treatment goal.     Continue nutritional focus and work towards new exercise FITT goal of: 2-2-4-2.   The current plan for this " month includes: continue current exercise efforts and continue nutrition focus. Goal 6-8lb. Try probiotic for constipation. Continue to focus on protein and hydration. Try [employer] food journal ligia, using [employer]. Just completed 2nd wegovy dose this morning, increase to 1.7mg when all 4 doses are complete. Treatment goal 145lb.       Orders:  -     Semaglutide-Weight Management (Wegovy) 1.7 MG/0.75ML solution auto-injector; Inject 0.75 mL under the skin into the appropriate area as directed 1 (One) Time Per Week.  Dispense: 3 mL; Refill: 2    2. Gastroesophageal reflux disease without esophagitis  Assessment & Plan:  Improving, using prevacid and rolaids PRN. Knows food triggers and tries to avoid those. Continue with weight reduction.       3. Primary hypertension  Assessment & Plan:  Hypertension is stable. Up today because she did her wegovy injection and she's nauseated.  Continue current treatment regimen.  Weight loss.  Regular aerobic exercise.  Continue current medications.  Blood pressure will be reassessed at the next regular appointment.        4. Other constipation  -     Bacillus Coagulans-Inulin (Align Prebiotic-Probiotic) 5-1.25 MG-GM chewable tablet; Chew 1 tablet Daily.  Dispense: 30 tablet; Refill: 0    5. Nausea  -     ondansetron ODT (ZOFRAN-ODT) 4 MG disintegrating tablet; Place 1 tablet on the tongue Every 8 (Eight) Hours As Needed for Nausea or Vomiting.  Dispense: 30 tablet; Refill: 0        Follow Up   Return in about 4 weeks (around 4/20/2023) for Next scheduled follow up.  Patient was given instructions and counseling regarding her condition or for health maintenance advice. Please see specific information pulled into the AVS if appropriate.     Radha Lezama, APRN  03/23/2023

## 2023-04-27 ENCOUNTER — OFFICE VISIT (OUTPATIENT)
Dept: BARIATRICS/WEIGHT MGMT | Facility: CLINIC | Age: 58
End: 2023-04-27
Payer: COMMERCIAL

## 2023-04-27 VITALS
SYSTOLIC BLOOD PRESSURE: 110 MMHG | WEIGHT: 203 LBS | BODY MASS INDEX: 32.62 KG/M2 | DIASTOLIC BLOOD PRESSURE: 60 MMHG | HEART RATE: 98 BPM | OXYGEN SATURATION: 95 % | HEIGHT: 66 IN

## 2023-04-27 DIAGNOSIS — I10 PRIMARY HYPERTENSION: ICD-10-CM

## 2023-04-27 DIAGNOSIS — E66.09 CLASS 1 OBESITY DUE TO EXCESS CALORIES WITH SERIOUS COMORBIDITY AND BODY MASS INDEX (BMI) OF 32.0 TO 32.9 IN ADULT: Primary | ICD-10-CM

## 2023-04-27 PROCEDURE — 99213 OFFICE O/P EST LOW 20 MIN: CPT | Performed by: NURSE PRACTITIONER

## 2023-04-27 RX ORDER — POLYETHYLENE GLYCOL 3350 17 G/17G
17 POWDER, FOR SOLUTION ORAL DAILY
COMMUNITY

## 2023-04-27 NOTE — ASSESSMENT & PLAN NOTE
Patient's (Body mass index is 32.77 kg/m².) indicates that they are obese (BMI >30) with health conditions that include hypertension, dyslipidemias, GERD and ^ALT . Weight is improving with treatment. BMI  is above average; BMI management plan is completed. We discussed low calorie, low carb based diet program, portion control, increasing exercise, management of depression/anxiety/stress to control compensatory eating, pharmacologic options including wegovy and an ligia-based approach such as [employer] Pal or Lose It.    I have instructed the patient to continue with pursuit of medical weight loss as a part of this program. Patient does meet criteria for use of anorectics at this time as BMI >30 , is not at treatment goal and this medication is indicated for LONG TERM use for management of obesity.     Continue nutritional focus and work towards new exercise FITT goal of: 2-2-4-2 as tolerated with post covid fatigue.   The current plan for this month includes: continue current exercise efforts, weight loss goal 4-6lbs this month and continue nutrition focus. Continue wegovy at 1.7mg another 4 weeks due to extreme fullness and good weight loss. Reassess next visit.

## 2023-04-27 NOTE — PROGRESS NOTES
[facility]  [address]  [place] [address]     Office Note      Date: 2023  Patient Name: Dana Jackson  MRN: [id number]  : 1965  Subjective  Subjective     Chief Complaint  Obesity Management follow-up          Dana Jackson presents to [facility] for obesity management. She had covid since her last visit, was very fatigued and sick for about 10 days, just now starting to walk again.  Patient is satisfied with weight loss progress. Appetite is well controlled. She feels very full on the wegovy 1.7mg, if she drinks water she has no room for food and vice versa. Reports no nausea and constipation is getting better with daily stool softener and miralax twice. Gets full very fast.The patient is taking multivitamin and is taking fish oil.  The patient is using a food journal, hitting macronutrient goals.   Typical intake:  B: protein shake  8 oz water  L: carb smart wrap with lowfat spinach artichoke dip and rotisserie chicken or good food frozen meal (creamy chicken jarod-500)  D: ground turkey tacos on lettuce wrap  The patient is exercising, working her way back up by walking about 20 minutes at a time and 15 minutes on the bicycle. FITT score of:    Frequency Intensity Time Strength Training   []   0, none []   0 []   0 []   0   [x]   1 (1-2x/week) [x]   1 (light) []   1 (<10 min) []   1 (1x/week)   []   2 (3-5x/week) []   2 (moderate) [x]   2 (10-20 min) [x]   2 (2x/week)   []   3 (daily) []   3 (moderately hard)  []   4 (very hard) []   3 (20-30 min)  []   4 (>30 min) []   3 (3-4x/week)     Review of Systems   Constitutional: Negative for appetite change and fatigue.   Eyes: Negative for blurred vision, double vision and visual disturbance.   Cardiovascular: Negative for chest pain and palpitations.   Gastrointestinal: Negative for abdominal pain, constipation, diarrhea, nausea, vomiting and GERD.  "  Endocrine: Negative for polydipsia, polyphagia and polyuria.   Musculoskeletal: Negative for arthralgias, back pain and myalgias.   Neurological: Negative for dizziness, tremors, light-headedness, headache and memory problem.        Parasthesias negative   Psychiatric/Behavioral: Negative for sleep disturbance, depressed mood and stress. The patient is not nervous/anxious.      Objective   Start weight: 234 pounds.    Total Loss lb/%Loss of beginning body weight (BBW): -31lb/-13.25%  Change in weight since last visit: -8lb    Body mass index is 32.77 kg/m².   Body composition analysis completed and showed:   %body fat: 48.3  Measurements (in inches)  Waist: 41\"    Vital Signs:   /60   Pulse 98   Ht 167.6 cm (66\")   Wt 92.1 kg (203 lb)   SpO2 95%   BMI 32.77 kg/m²     Physical Exam   General appears stated age and normal appearance   HEENT PERRLA, EOM intact and conjunctivae normal   Chest/lungs Normal rate, Regular rhythm and Breathing is unlabored   Extremities without edema   Neuro Good historian and No focal deficit   Skin Warm, dry, intact   Psych normal behavior, normal thought content and normal concentration     Result Review :                Assessment / Plan        Diagnoses and all orders for this visit:    1. Class 1 obesity due to excess calories with serious comorbidity and body mass index (BMI) of 32.0 to 32.9 in adult (Primary)  Assessment & Plan:  Patient's (Body mass index is 32.77 kg/m².) indicates that they are obese (BMI >30) with health conditions that include hypertension, dyslipidemias, GERD and ^ALT . Weight is improving with treatment. BMI  is above average; BMI management plan is completed. We discussed low calorie, low carb based diet program, portion control, increasing exercise, management of depression/anxiety/stress to control compensatory eating, pharmacologic options including wegovy and an ligia-based approach such as [employer] Pal or Lose It.    I have instructed the " patient to continue with pursuit of medical weight loss as a part of this program. Patient does meet criteria for use of anorectics at this time as BMI >30 , is not at treatment goal and this medication is indicated for LONG TERM use for management of obesity.     Continue nutritional focus and work towards new exercise FITT goal of: 2-2-4-2 as tolerated with post covid fatigue.   The current plan for this month includes: continue current exercise efforts, weight loss goal 4-6lbs this month and continue nutrition focus. Continue wegovy at 1.7mg another 4 weeks due to extreme fullness and good weight loss. Reassess next visit.          2. Primary hypertension  Assessment & Plan:  Hypertension is improving with lifestyle modifications.  Continue current treatment regimen.  Weight loss.  Regular aerobic exercise.  Continue current medications.  Blood pressure will be reassessed at the next regular appointment.          Follow Up   Return in about 6 weeks (around 6/8/2023) for Next scheduled follow up.  Patient was given instructions and counseling regarding her condition or for health maintenance advice. Please see specific information pulled into the AVS if appropriate.     Radha Lezama, APRN  04/27/2023

## 2023-04-27 NOTE — ASSESSMENT & PLAN NOTE
Hypertension is improving with lifestyle modifications.  Continue current treatment regimen.  Weight loss.  Regular aerobic exercise.  Continue current medications.  Blood pressure will be reassessed at the next regular appointment.

## 2023-05-12 ENCOUNTER — PATIENT MESSAGE (OUTPATIENT)
Dept: BARIATRICS/WEIGHT MGMT | Facility: CLINIC | Age: 58
End: 2023-05-12
Payer: COMMERCIAL

## 2023-05-12 DIAGNOSIS — E66.09 CLASS 1 OBESITY DUE TO EXCESS CALORIES WITH SERIOUS COMORBIDITY AND BODY MASS INDEX (BMI) OF 34.0 TO 34.9 IN ADULT: Primary | ICD-10-CM

## 2023-05-12 RX ORDER — SEMAGLUTIDE 2.4 MG/.75ML
2.4 INJECTION, SOLUTION SUBCUTANEOUS WEEKLY
Qty: 3 ML | Refills: 2 | Status: SHIPPED | OUTPATIENT
Start: 2023-05-12

## 2023-05-12 NOTE — TELEPHONE ENCOUNTER
From: Dana Jackson  To: Radha Lezama  Sent: 5/12/2023 12:27 PM EDT  Subject: Advancing Wegovy prescription    Radha,  I'm having no issues with the 1.7 mg and think we can advance to the next dose which I will need sent to [facility] in the next 2 weeks. Lost 2 lbs each week for total of 4 lbs down since last visit. No vomiting, nausea minimal not requiring treatment and BMs are regular. My next appointment is July 6th.

## 2023-09-07 ENCOUNTER — OFFICE VISIT (OUTPATIENT)
Dept: BARIATRICS/WEIGHT MGMT | Facility: CLINIC | Age: 58
End: 2023-09-07
Payer: COMMERCIAL

## 2023-09-07 VITALS
SYSTOLIC BLOOD PRESSURE: 124 MMHG | DIASTOLIC BLOOD PRESSURE: 80 MMHG | OXYGEN SATURATION: 99 % | BODY MASS INDEX: 28.54 KG/M2 | HEIGHT: 66 IN | RESPIRATION RATE: 16 BRPM | HEART RATE: 73 BPM | WEIGHT: 177.6 LBS

## 2023-09-07 DIAGNOSIS — R73.03 PRE-DIABETES: ICD-10-CM

## 2023-09-07 DIAGNOSIS — R74.01 ALT (SGPT) LEVEL RAISED: ICD-10-CM

## 2023-09-07 DIAGNOSIS — I10 PRIMARY HYPERTENSION: ICD-10-CM

## 2023-09-07 DIAGNOSIS — E78.49 OTHER HYPERLIPIDEMIA: ICD-10-CM

## 2023-09-07 DIAGNOSIS — K21.9 GASTROESOPHAGEAL REFLUX DISEASE WITHOUT ESOPHAGITIS: ICD-10-CM

## 2023-09-07 DIAGNOSIS — E66.3 OVERWEIGHT WITH BODY MASS INDEX (BMI) OF 28 TO 28.9 IN ADULT: Primary | ICD-10-CM

## 2023-09-07 PROCEDURE — 99214 OFFICE O/P EST MOD 30 MIN: CPT | Performed by: NURSE PRACTITIONER

## 2023-09-07 RX ORDER — SEMAGLUTIDE 2.4 MG/.75ML
2.4 INJECTION, SOLUTION SUBCUTANEOUS WEEKLY
Qty: 3 ML | Refills: 2 | Status: SHIPPED | OUTPATIENT
Start: 2023-09-07

## 2023-09-07 NOTE — PROGRESS NOTES
[facility]  [address]  [place] [address]     Office Note      Date: 2023  Patient Name: Dana Jackson  MRN: [id number]  : 1965  Subjective  Subjective     Chief Complaint  Obesity Management follow-up          Dana Jackson presents to [facility] for obesity management.   Patient is satisfied with weight loss progress. Appetite is moderately controlled. Reports no side effects of prescribed medications today. The patient is taking multivitamin and is taking fish oil. The patient is using a food journal, luma-id. Reviewed macronutrient goals: calories 5838-2597, protein 100-115g/day, net carbs 45-55g/day. Drinking adequate water. Only using tums occasionally, not taking prevacid regularly. She is traveling to [place] for 3 weeks.   The patient is exercising, walking at night, added resistance bands. Touring gyms, plans on joining in October. FITT score of:     Frequency Intensity Time Strength Training   []   0, none []   0 []   0 []   0   []   1 (1-2x/week) []   1 (light) []   1 (<10 min) []   1 (1x/week)   []   2 (3-5x/week) [x]   2 (moderate) []   2 (10-20 min) [x]   2 (2x/week)   [x]   3 (daily) []   3 (moderately hard)  []   4 (very hard) []   3 (20-30 min)  [x]   4 (>30 min) []   3 (3-4x/week)     Review of Systems   Constitutional:  Negative for appetite change and fatigue.   Eyes:  Negative for blurred vision, double vision and visual disturbance.   Cardiovascular:  Negative for chest pain and palpitations.   Gastrointestinal:  Negative for abdominal pain, constipation, diarrhea, nausea, vomiting and GERD.   Endocrine: Negative for polydipsia, polyphagia and polyuria.   Musculoskeletal:  Negative for arthralgias, back pain and myalgias.   Neurological:  Negative for dizziness, tremors, light-headedness, headache and memory problem.        Parasthesias negative   Psychiatric/Behavioral:  Negative  "for sleep disturbance, depressed mood and stress. The patient is not nervous/anxious.    All other systems reviewed and are negative.    Objective   Start weight: 234 pounds.    Total Loss lb/%Loss of beginning body weight (BBW): -56.4 lb/-24.10%  Change in weight since last visit: -11.8    Body mass index is 28.67 kg/m².   Body composition analysis completed and showed:   Body Fat %: 43.0    Measurements (in inches)  Waist Circumference: 40      Vital Signs:   /80 (BP Location: Left arm, Patient Position: Sitting)   Pulse 73   Resp 16   Ht 167.6 cm (66\")   Wt 80.6 kg (177 lb 9.6 oz)   SpO2 99%   BMI 28.67 kg/m²     Physical Exam   General appears stated age and normal appearance   HEENT PERRLA, EOM intact, and conjunctivae normal   Chest/lungs Normal rate, Regular rhythm, and Breathing is unlabored   Extremities without edema   Neuro Good historian and No focal deficit   Skin Warm, dry, intact   Psych normal behavior, normal thought content, and normal concentration     Result Review :   The following data was reviewed by: NAVYA Varma on 09/07/2023:  External labwork with PCP on 07/07/2023.               Assessment / Plan        Diagnoses and all orders for this visit:    1. Overweight with body mass index (BMI) of 28 to 28.9 in adult (Primary)  Assessment & Plan:  Patient's (Body mass index is 28.67 kg/m².) indicates that they are overweight with health conditions that include hypertension, dyslipidemias, GERD, osteoarthritis, and prediabetes  . Weight is improving with treatment. BMI is is above average; BMI management plan is completed. We discussed low calorie, low carb based diet program, portion control, increasing exercise, management of depression/anxiety/stress to control compensatory eating, pharmacologic options including wegovy, and an ligia-based approach such as [employer] Pal or Lose It.     I have instructed the patient to continue with pursuit of medical weight loss as a part of " this program. Patient does meet criteria for use of anorectics at this time as BMI > 27 with coexisting, hyperlipidemia, hypertension, impaired fasting glucose, is not at treatment goal, and this medication is indicated for LONG TERM use for management of obesity.     Continue nutritional focus and work towards new exercise FITT goal of: 2-2-4-2 (plans to join a gym when she returns from [place] in October)  The current plan for this month includes:   - Continue current exercise efforts  - Continue nutrition focus  - Continue to prioritize protein, fiber, and hydration.   - Treatment goal 145-155lb.       Orders:  -     Semaglutide-Weight Management (Wegovy) 2.4 MG/0.75ML solution auto-injector; Inject 2.4 mg under the skin into the appropriate area as directed 1 (One) Time Per Week.  Dispense: 3 mL; Refill: 2    2. Pre-diabetes  Assessment & Plan:  Improving, A1c now 4.9. Denies hypoglycemia. Continue low carb diet, regular physical activity, and weight reduction of > 15%. Continue wegovy.         3. Other hyperlipidemia  Assessment & Plan:  Lipid abnormalities are improving with lifestyle modifications.  HDL slightly low at 47, all other cholesterol levels are now within normal limits.   Lipids will be reassessed in 1 year.      4. Primary hypertension  Assessment & Plan:  Hypertension is improving with lifestyle modifications, stable without BP medication, was discontinued about 3 months ago.   Continue current treatment regimen.  Weight loss.  Regular aerobic exercise.  Blood pressure will be reassessed at the next regular appointment.      5. Gastroesophageal reflux disease without esophagitis  Assessment & Plan:  Improving with weight reduction and lifestyle changes. Only needing tums or Prevacid PRN a few times a week. Reassess in 3 months.        6. ALT (SGPT) level raised  Assessment & Plan:  Resolved with lifestyle change and weight reduction.             Follow Up   No follow-ups on file.  Patient was  given instructions and counseling regarding her condition or for health maintenance advice. Please see specific information pulled into the AVS if appropriate.     Radha Lezama, NAVYA  09/07/2023

## 2023-09-07 NOTE — ASSESSMENT & PLAN NOTE
Improving, A1c now 4.9. Denies hypoglycemia. Continue low carb diet, regular physical activity, and weight reduction of > 15%. Continue wegovy.

## 2023-09-07 NOTE — ASSESSMENT & PLAN NOTE
Hypertension is improving with lifestyle modifications, stable without BP medication, was discontinued about 3 months ago.   Continue current treatment regimen.  Weight loss.  Regular aerobic exercise.  Blood pressure will be reassessed at the next regular appointment.

## 2023-09-07 NOTE — ASSESSMENT & PLAN NOTE
Lipid abnormalities are improving with lifestyle modifications.  HDL slightly low at 47, all other cholesterol levels are now within normal limits.   Lipids will be reassessed in 1 year.

## 2023-09-07 NOTE — ASSESSMENT & PLAN NOTE
Improving with weight reduction and lifestyle changes. Only needing tums or Prevacid PRN a few times a week. Reassess in 3 months.

## 2023-09-07 NOTE — ASSESSMENT & PLAN NOTE
Patient's (Body mass index is 28.67 kg/m².) indicates that they are overweight with health conditions that include hypertension, dyslipidemias, GERD, osteoarthritis, and prediabetes  . Weight is improving with treatment. BMI is is above average; BMI management plan is completed. We discussed low calorie, low carb based diet program, portion control, increasing exercise, management of depression/anxiety/stress to control compensatory eating, pharmacologic options including wegovy, and an ligia-based approach such as [employer] Pal or Lose It.     I have instructed the patient to continue with pursuit of medical weight loss as a part of this program. Patient does meet criteria for use of anorectics at this time as BMI > 27 with coexisting, hyperlipidemia, hypertension, impaired fasting glucose, is not at treatment goal, and this medication is indicated for LONG TERM use for management of obesity.     Continue nutritional focus and work towards new exercise FITT goal of: 2-2-4-2 (plans to join a gym when she returns from [place] in October)  The current plan for this month includes:   - Continue current exercise efforts  - Continue nutrition focus  - Continue to prioritize protein, fiber, and hydration.   - Treatment goal 145-155lb.

## 2023-09-08 ENCOUNTER — PRIOR AUTHORIZATION (OUTPATIENT)
Dept: BARIATRICS/WEIGHT MGMT | Facility: CLINIC | Age: 58
End: 2023-09-08
Payer: COMMERCIAL

## 2023-10-30 ENCOUNTER — TELEMEDICINE (OUTPATIENT)
Dept: BARIATRICS/WEIGHT MGMT | Facility: CLINIC | Age: 58
End: 2023-10-30
Payer: COMMERCIAL

## 2023-10-30 VITALS
RESPIRATION RATE: 20 BRPM | SYSTOLIC BLOOD PRESSURE: 109 MMHG | WEIGHT: 168.8 LBS | HEART RATE: 76 BPM | HEIGHT: 66 IN | OXYGEN SATURATION: 100 % | DIASTOLIC BLOOD PRESSURE: 67 MMHG | BODY MASS INDEX: 27.13 KG/M2

## 2023-10-30 DIAGNOSIS — E66.3 OVERWEIGHT WITH BODY MASS INDEX (BMI) OF 27 TO 27.9 IN ADULT: Primary | ICD-10-CM

## 2023-10-30 DIAGNOSIS — L65.9 HAIR LOSS: ICD-10-CM

## 2023-10-30 PROCEDURE — 99214 OFFICE O/P EST MOD 30 MIN: CPT | Performed by: NURSE PRACTITIONER

## 2023-10-30 RX ORDER — SEMAGLUTIDE 2.4 MG/.75ML
2.4 INJECTION, SOLUTION SUBCUTANEOUS WEEKLY
Qty: 3 ML | Refills: 2 | Status: SHIPPED | OUTPATIENT
Start: 2023-10-30

## 2023-10-30 RX ORDER — ZINC GLUCONATE 50 MG
1 TABLET ORAL DAILY
Start: 2023-10-30

## 2023-10-30 RX ORDER — BIOTIN 10 MG
1 TABLET ORAL DAILY
Start: 2023-10-30

## 2023-10-30 NOTE — PROGRESS NOTES
"     Office Note      Date: 10/30/2023  Patient Name: Dana Jackson  MRN: [id number]  : 1965    This service was conducted via [employer].  Patient is located at her home address.  Provider is located at her work address. The use of a video visit has been reviewed with the patient and informed consent has been obtained.  Subjective  Subjective     Chief Complaint  Obesity Management follow-up    Subjective          Dana Jackson presents to [facility] via telehealth for obesity management.     Patient is satisfied with weight loss progress. Appetite is well controlled. Reports no side effects of prescribed medications today. Went to [place] for 3 weeks. Packed protein bars and snacks. Was able to use her food [employer] for the first week then her ligia stopped working so she kept notes instead. She tried to stick with fish and keep carbs limited. Walked a lot. She got back one week ago and is right back into her diet routine- low carb, high protein. Hair seems to be falling out worse, unsure if it is related to change of season.     The patient is exercising, starting boot camp on Wednesday- her goal is three days a week. Considering yoga also.    Objective   Start weight: 234 pounds.    Total weight loss: -66 pounds/-39%  Change in weight since last visit: -9lb  Waist measurement: 38\"    Body mass index is 27.25 kg/m².   Measurements (in inches)     /67   Pulse 76   Resp 20   Ht 167.6 cm (66\")   Wt 76.6 kg (168 lb 12.8 oz)   SpO2 100%   BMI 27.25 kg/m²       Result Review :                 Assessment and Plan    Diagnoses and all orders for this visit:    1. Overweight with body mass index (BMI) of 27 to 27.9 in adult (Primary)  Assessment & Plan:  Patient's (Body mass index is 27.25 kg/m².) indicates that they are overweight with health conditions that include hypertension, dyslipidemias, GERD, osteoarthritis, and pre-diabetes  . Weight is improving " with treatment. BMI is is above average; BMI management plan is completed. We discussed low calorie, low carb based diet program, portion control, increasing exercise, pharmacologic options including wegovy, and an ligia-based approach such as [employer] Pal or Lose It.     I have instructed the patient to continue with pursuit of medical weight loss as a part of this program. Patient does meet criteria for use of anorectics at this time as BMI > 27 with coexisting, hyperlipidemia, hypertension, impaired fasting glucose, is not at treatment goal, and this medication is indicated for LONG TERM use for management of obesity.     The current plan for this month includes:   - Continue current exercise efforts  - Continue nutrition focus  - Continue to prioritize protein, fiber, and hydration.   - Treatment goal 145-155lb.       Orders:  -     Semaglutide-Weight Management (Wegovy) 2.4 MG/0.75ML solution auto-injector; Inject 2.4 mg under the skin into the appropriate area as directed 1 (One) Time Per Week.  Dispense: 3 mL; Refill: 2    2. Hair loss  Assessment & Plan:  Newly identified in the last few weeks. Consumes >100g/day of protein. Advised to add biotin and zinc. Reassess next visit.     Orders:  -     Biotin 10 MG tablet; Take 1 tablet by mouth Daily.  -     Zinc 50 MG tablet; Take 1 tablet by mouth Daily.        Follow Up   Return in about 6 weeks (around 12/11/2023) for Next scheduled follow up.  Patient was given instructions and counseling regarding her condition or for health maintenance advice. Please see specific information pulled into the AVS if appropriate.     NAVYA Linares

## 2023-10-30 NOTE — ASSESSMENT & PLAN NOTE
Newly identified in the last few weeks. Likely related to wegovy and weight loss. Consumes >100g/day of protein. Advised to add biotin and zinc. Reassess next visit.

## 2023-10-30 NOTE — ASSESSMENT & PLAN NOTE
Patient's (Body mass index is 27.25 kg/m².) indicates that they are overweight with health conditions that include hypertension, dyslipidemias, GERD, osteoarthritis, and pre-diabetes  . Weight is improving with treatment. BMI is is above average; BMI management plan is completed. We discussed low calorie, low carb based diet program, portion control, increasing exercise, pharmacologic options including wegovy, and an ligia-based approach such as [employer] Pal or Lose It.     Side effect of treatment: hair loss    I have instructed the patient to continue with pursuit of medical weight loss as a part of this program. Patient does meet criteria for use of anorectics at this time as BMI > 27 with coexisting, hyperlipidemia, hypertension, impaired fasting glucose, is not at treatment goal, and this medication is indicated for LONG TERM use for management of obesity.     The current plan for this month includes:   - Continue current exercise efforts  - Continue nutrition focus  - Continue to prioritize protein, fiber, and hydration.   - Treatment goal 145-155lb.

## 2023-12-10 NOTE — TELEPHONE ENCOUNTER
FLAVIO LONDON (Key: [id number])  Rx #: [id number]  Wegovy 0.5MG/0.5ML auto-injectors    Approved 01/16/2023 to 08/16/2023  
negative

## 2023-12-19 ENCOUNTER — OFFICE VISIT (OUTPATIENT)
Dept: BARIATRICS/WEIGHT MGMT | Facility: CLINIC | Age: 58
End: 2023-12-19
Payer: COMMERCIAL

## 2023-12-19 VITALS
SYSTOLIC BLOOD PRESSURE: 134 MMHG | RESPIRATION RATE: 16 BRPM | DIASTOLIC BLOOD PRESSURE: 88 MMHG | HEIGHT: 66 IN | WEIGHT: 163 LBS | HEART RATE: 80 BPM | OXYGEN SATURATION: 99 % | BODY MASS INDEX: 26.2 KG/M2

## 2023-12-19 DIAGNOSIS — R73.03 PRE-DIABETES: ICD-10-CM

## 2023-12-19 DIAGNOSIS — E66.3 OVERWEIGHT WITH BODY MASS INDEX (BMI) OF 26 TO 26.9 IN ADULT: Primary | ICD-10-CM

## 2023-12-19 PROCEDURE — 99214 OFFICE O/P EST MOD 30 MIN: CPT | Performed by: NURSE PRACTITIONER

## 2023-12-19 RX ORDER — SEMAGLUTIDE 2.4 MG/.75ML
2.4 INJECTION, SOLUTION SUBCUTANEOUS WEEKLY
Qty: 3 ML | Refills: 2 | Status: SHIPPED | OUTPATIENT
Start: 2023-12-19

## 2023-12-19 NOTE — ASSESSMENT & PLAN NOTE
Patient's (Body mass index is 26.31 kg/m².) indicates that they are overweight with health conditions that include hypertension, dyslipidemias, osteoarthritis, and prediabetes, ^LFTs  . Weight is improving with treatment. BMI is is above average; BMI management plan is completed. We discussed low calorie, low carb based diet program, portion control, and pharmacologic options including wegovy .     I have instructed the patient to continue with pursuit of medical weight loss as a part of this program. Patient does meet criteria for use of anorectics at this time as BMI >25 with , hyperlipidemia, hypertension, impaired fasting glucose, and is not at treatment goal.     The current plan for this month includes:   - Continue nutrition focus  - Continue to prioritize protein, fiber, and hydration.   - Reviewed change in %body fat and lean body mass since September. Lean body mass has only decreased by 1lb which is less than expected with the 14.6lb decrease in fat mass. Discussed that in order to increase carbs, another macronutrient will have to be decreased and decreasing protein below 100g/day is not advised. The body can convert protein to carbs but it cannot obtain amino acids/protein from any other source but diet. She is tolerating low carb diet well, ok to keep net carbs around 50-75g/day. Continue excellent exercise efforts that include strength training and cardio.   - Treatment goal 145-155lb with continued improvement in % body fat.

## 2023-12-19 NOTE — ASSESSMENT & PLAN NOTE
Denies hypoglycemia. Continue low carb diet, regular physical activity, and maintain weight reduction of >15%. Continue wegovy.

## 2023-12-19 NOTE — PROGRESS NOTES
[facility]  [address]  [place] [address]     Office Note      Date: 2023  Patient Name: Dana Jackson  MRN: [id number]  : 1965  Subjective  Subjective     Chief Complaint  Obesity Management follow-up          Dana Jackson presents to [facility] for obesity management.   Patient is satisfied with weight loss progress. Appetite is moderately controlled. Reports no side effects of prescribed medications today. Denies constipation, hair loss has slowed down with addition of biotin and increase in zinc and new hair products. The patient is taking multivitamin and is taking fish oil.  The patient is using a food journal, [employer]. Calories are <1200, protein 100g/day, net carbs <65g/day. She was advised by the [facility] dietitician to increase her carbs and also to increase her muscle mass and she would like to discuss that today. She did increase her carbs by 25-50 g/day (sweet potato, vegetable medley, rice) and didn't feel as good (tired, stomach discomfort). She just completed an 8 week boot camp that included weights and lots of lunges/squats, participated 3 days a week and does treadmill/elliptical on off days. Plans to start a different program at the beginning of the year.    The patient is exercising with a FITT score of:    Frequency Intensity Time Strength Training   []   0, none []   0 []   0 []   0   []   1 (1-2x/week) []   1 (light) []   1 (<10 min) []   1 (1x/week)   [x]   2 (3-5x/week) [x]   2 (moderate) []   2 (10-20 min) []   2 (2x/week)   []   3 (daily) []   3 (moderately hard)  []   4 (very hard) []   3 (20-30 min)  [x]   4 (>30 min) [x]   3 (3-4x/week)       Review of Systems   Constitutional:  Negative for appetite change and fatigue.   Eyes:  Negative for visual disturbance.   Cardiovascular:  Negative for chest pain and palpitations.   Gastrointestinal:  Negative for constipation and  "indigestion.   Neurological:  Negative for light-headedness.   All other systems reviewed and are negative.        Current Outpatient Medications:     Bacillus Coagulans-Inulin (Align Prebiotic-Probiotic) 5-1.25 MG-GM chewable tablet, Chew 1 tablet Daily., Disp: 30 tablet, Rfl: 0    Biotin 10 MG tablet, Take 1 tablet by mouth Daily., Disp: , Rfl:     Calcium-Magnesium-Vitamin D (CALCIUM 1200+D3 PO), As Needed., Disp: , Rfl:     estradiol (VIVELLE-DOT) 0.0375 MG/24HR patch, CHANGE PATCH TWICE PER WEEK, Disp: , Rfl:     lansoprazole (PREVACID) 15 MG capsule, Take 1 capsule by mouth As Needed., Disp: , Rfl:     multivitamin with minerals tablet tablet, Take 1 tablet by mouth Daily., Disp: , Rfl:     Omega-3 Fatty Acids (fish oil) 1000 MG capsule capsule, Take 1 capsule by mouth 2 (Two) Times a Day With Meals., Disp: , Rfl:     ondansetron ODT (ZOFRAN-ODT) 4 MG disintegrating tablet, Place 1 tablet on the tongue Every 8 (Eight) Hours As Needed for Nausea or Vomiting., Disp: 30 tablet, Rfl: 0    polyethylene glycol (MIRALAX) 17 g packet, Take 17 g by mouth Daily As Needed., Disp: , Rfl:     Semaglutide-Weight Management (Wegovy) 2.4 MG/0.75ML solution auto-injector, Inject 2.4 mg under the skin into the appropriate area as directed 1 (One) Time Per Week., Disp: 3 mL, Rfl: 2    Zinc 50 MG tablet, Take 1 tablet by mouth Daily., Disp: , Rfl:     Objective   Start weight: 234 pounds.    Total Loss lb/%Loss of beginning body weight (BBW): -71 lb/-30.34%  Change in weight since last visit: -5.8    Body mass index is 26.31 kg/m².   Body composition analysis completed and showed:   Body Fat %: 38.1    Measurements (in inches)  Waist Circumference: 38      Vital Signs:   /88 (BP Location: Left arm, Patient Position: Sitting)   Pulse 80   Resp 16   Ht 167.6 cm (66\")   Wt 73.9 kg (163 lb)   SpO2 99%   BMI 26.31 kg/m²     Physical Exam   General appears stated age and normal appearance   HEENT PERRLA, EOM intact, and " conjunctivae normal   Chest/lungs Normal rate, Regular rhythm, and Breathing is unlabored   Extremities without edema   Neuro Good historian and No focal deficit   Skin Warm, dry, intact   Psych normal behavior, normal thought content, and normal concentration     Result Review :                Assessment / Plan        Diagnoses and all orders for this visit:    1. Overweight with body mass index (BMI) of 26 to 26.9 in adult (Primary)  Assessment & Plan:  Patient's (Body mass index is 26.31 kg/m².) indicates that they are overweight with health conditions that include hypertension, dyslipidemias, osteoarthritis, and prediabetes, ^LFTs  . Weight is improving with treatment. BMI is is above average; BMI management plan is completed. We discussed low calorie, low carb based diet program, portion control, and pharmacologic options including wegovy .     I have instructed the patient to continue with pursuit of medical weight loss as a part of this program. Patient does meet criteria for use of anorectics at this time as BMI >25 with , hyperlipidemia, hypertension, impaired fasting glucose, and is not at treatment goal.     The current plan for this month includes:   - Continue nutrition focus  - Continue to prioritize protein, fiber, and hydration.   - Reviewed change in %body fat and lean body mass since September. Lean body mass has only decreased by 1lb which is less than expected with the 14.6lb decrease in fat mass. Discussed that in order to increase carbs, another macronutrient will have to be decreased and decreasing protein below 100g/day is not advised. The body can convert protein to carbs but it cannot obtain amino acids/protein from any other source but diet. She is tolerating low carb diet well, ok to keep net carbs around 50-75g/day. Continue excellent exercise efforts that include strength training and cardio.   - Treatment goal 145-155lb with continued improvement in % body fat.       Orders:  -      Semaglutide-Weight Management (Wegovy) 2.4 MG/0.75ML solution auto-injector; Inject 2.4 mg under the skin into the appropriate area as directed 1 (One) Time Per Week.  Dispense: 3 mL; Refill: 2    2. Pre-diabetes  Assessment & Plan:  Denies hypoglycemia. Continue low carb diet, regular physical activity, and maintain weight reduction of >15%. Continue wegovy.               Follow Up   Return in about 6 weeks (around 1/30/2024) for Next scheduled follow up, Labs next visit.  Patient was given instructions and counseling regarding her condition or for health maintenance advice. Please see specific information pulled into the AVS if appropriate.     Radha Lezama, APRN  12/19/2023

## 2023-12-20 ENCOUNTER — TRANSCRIBE ORDERS (OUTPATIENT)
Dept: CT IMAGING | Facility: CLINIC | Age: 58
End: 2023-12-20
Payer: COMMERCIAL

## 2023-12-20 DIAGNOSIS — Z12.31 ENCOUNTER FOR SCREENING MAMMOGRAM FOR MALIGNANT NEOPLASM OF BREAST: Primary | ICD-10-CM

## 2024-01-11 DIAGNOSIS — E66.3 OVERWEIGHT WITH BODY MASS INDEX (BMI) OF 26 TO 26.9 IN ADULT: ICD-10-CM

## 2024-01-11 NOTE — TELEPHONE ENCOUNTER
Rx Refill Note  Requested Prescriptions     Pending Prescriptions Disp Refills    Semaglutide-Weight Management (Wegovy) 2.4 MG/0.75ML solution auto-injector 3 mL 2     Sig: Inject 2.4 mg under the skin into the appropriate area as directed 1 (One) Time Per Week.      Last office visit with prescribing clinician: 12/19/2023   Last telemedicine visit with prescribing clinician: 10/30/2023   Next office visit with prescribing clinician: 1/30/2024                         Would you like a call back once the refill request has been completed: [] Yes [] No    If the office needs to give you a call back, can they leave a voicemail: [] Yes [] No    Per Patient--My RX guidelines have changed and for me to get this I need a 3 month script and will have to pay $1900.  For a 1 month script it is $1200 .       Devika Adorno MA  01/11/24, 15:47 EST

## 2024-01-12 RX ORDER — SEMAGLUTIDE 2.4 MG/.75ML
2.4 INJECTION, SOLUTION SUBCUTANEOUS WEEKLY
Qty: 9 ML | Refills: 0 | Status: SHIPPED | OUTPATIENT
Start: 2024-01-12

## 2024-01-30 ENCOUNTER — OFFICE VISIT (OUTPATIENT)
Dept: BARIATRICS/WEIGHT MGMT | Facility: CLINIC | Age: 59
End: 2024-01-30
Payer: COMMERCIAL

## 2024-01-30 VITALS
RESPIRATION RATE: 16 BRPM | SYSTOLIC BLOOD PRESSURE: 126 MMHG | BODY MASS INDEX: 25.07 KG/M2 | DIASTOLIC BLOOD PRESSURE: 84 MMHG | HEIGHT: 66 IN | OXYGEN SATURATION: 99 % | WEIGHT: 156 LBS | HEART RATE: 89 BPM

## 2024-01-30 DIAGNOSIS — R73.03 PRE-DIABETES: ICD-10-CM

## 2024-01-30 DIAGNOSIS — R74.01 ALT (SGPT) LEVEL RAISED: ICD-10-CM

## 2024-01-30 DIAGNOSIS — E66.3 OVERWEIGHT WITH BODY MASS INDEX (BMI) OF 25 TO 25.9 IN ADULT: Primary | ICD-10-CM

## 2024-01-30 DIAGNOSIS — E78.49 OTHER HYPERLIPIDEMIA: ICD-10-CM

## 2024-01-30 PROCEDURE — 99214 OFFICE O/P EST MOD 30 MIN: CPT | Performed by: NURSE PRACTITIONER

## 2024-01-30 NOTE — PROGRESS NOTES
[facility]  [address]  [place] [address]     Office Note      Date: 2024  Patient Name: Dana Jackson  MRN: [id number]  : 1965  Subjective  Subjective     Chief Complaint  Obesity Management follow-up          Dana Jackson presents to [facility] for obesity management.   Patient is satisfied with weight loss progress. Appetite is moderately controlled. Reports no side effects of prescribed medications today. The patient is taking multivitamin and is not taking fish oil.  The patient is using a food journal. Still keeping calories at 1200 (1400 on days she gets more exercise), protein at 100-120g/day, carbs usually under 50g/day, occasionally up to 60 or 75g/day.  B: coconut greek yogurt (25g protein) with strawberries and blueberries  S: protein iced caramel shake  L: tuna with egg, pickle, mustard  S: protein bar  D: piece of chicken wing and a bite of pizza  The patient is exercising, getting stronger, biceps are defined, waist size is decreasing, with a FITT score of:    Frequency Intensity Time Strength Training   []   0, none []   0 []   0 []   0   []   1 (1-2x/week) []   1 (light) []   1 (<10 min) []   1 (1x/week)   [x]   2 (3-5x/week) [x]   2 (moderate) []   2 (10-20 min) []   2 (2x/week)   []   3 (daily) []   3 (moderately hard)  []   4 (very hard) []   3 (20-30 min)  [x]   4 (>30 min) [x]   3 (3-4x/week)       Review of Systems   Constitutional:  Negative for appetite change and fatigue.   Eyes:  Negative for visual disturbance.   Cardiovascular:  Negative for chest pain and palpitations.   Gastrointestinal:  Negative for constipation and indigestion.   Neurological:  Negative for light-headedness.   All other systems reviewed and are negative.      Current Outpatient Medications:   •  Bacillus Coagulans-Inulin (Align Prebiotic-Probiotic) 5-1.25 MG-GM chewable tablet, Chew 1 tablet Daily., Disp:  "30 tablet, Rfl: 0  •  Biotin 10 MG tablet, Take 1 tablet by mouth Daily., Disp: , Rfl:   •  Calcium-Magnesium-Vitamin D (CALCIUM 1200+D3 PO), As Needed., Disp: , Rfl:   •  estradiol (VIVELLE-DOT) 0.0375 MG/24HR patch, CHANGE PATCH TWICE PER WEEK, Disp: , Rfl:   •  lansoprazole (PREVACID) 15 MG capsule, Take 1 capsule by mouth As Needed., Disp: , Rfl:   •  multivitamin with minerals tablet tablet, Take 1 tablet by mouth Daily., Disp: , Rfl:   •  Omega-3 Fatty Acids (fish oil) 1000 MG capsule capsule, Take 1 capsule by mouth 2 (Two) Times a Day With Meals., Disp: , Rfl:   •  ondansetron ODT (ZOFRAN-ODT) 4 MG disintegrating tablet, Place 1 tablet on the tongue Every 8 (Eight) Hours As Needed for Nausea or Vomiting., Disp: 30 tablet, Rfl: 0  •  polyethylene glycol (MIRALAX) 17 g packet, Take 17 g by mouth Daily As Needed., Disp: , Rfl:   •  Semaglutide-Weight Management (Wegovy) 2.4 MG/0.75ML solution auto-injector, Inject 2.4 mg under the skin into the appropriate area as directed 1 (One) Time Per Week., Disp: 9 mL, Rfl: 0  •  Zinc 50 MG tablet, Take 1 tablet by mouth Daily., Disp: , Rfl:     Objective   Start weight: 234 pounds.    Total Loss lb/%Loss of beginning body weight (BBW): -78 lb/-33.33%  Change in weight since last visit: -7    Body mass index is 25.18 kg/m².   Body composition analysis completed and showed:   Body Fat %: 38.1    Measurements (in inches)  Waist Circumference: 39.5      Vital Signs:   /84 (BP Location: Left arm, Patient Position: Sitting)   Pulse 89   Resp 16   Ht 167.6 cm (66\")   Wt 70.8 kg (156 lb)   SpO2 99%   BMI 25.18 kg/m²     Physical Exam   General appears stated age and normal appearance   HEENT PERRLA, EOM intact, and conjunctivae normal   Chest/lungs Normal rate, Regular rhythm, and Breathing is unlabored   Extremities without edema   Neuro Good historian and No focal deficit   Skin Warm, dry, intact   Psych normal behavior, normal thought content, and normal " concentration     Result Review :   The following data was reviewed by: NAVYA Varma on 01/30/2024:  External labwork, see attached.                 Assessment / Plan        Diagnoses and all orders for this visit:    1. Overweight with body mass index (BMI) of 25 to 25.9 in adult (Primary)  Assessment & Plan:  Patient's (Body mass index is 25.18 kg/m².) indicates that they are overweight with health conditions that include hypertension, dyslipidemias, osteoarthritis, and prediabetes  . Weight is improving with treatment. BMI is is above average; BMI management plan is completed. We discussed low calorie, low carb based diet program, portion control, increasing exercise, pharmacologic options including wegovy, and an ligia-based approach such as [employer] Pal or Lose It.     I have instructed the patient to continue with pursuit of medical weight loss as a part of this program. Patient does meet criteria for use of anorectics at this time as this medication is indicated for LONG TERM use for management of obesity.     The current plan for this month includes:   - Continue current exercise efforts  - Continue to prioritize protein, fiber, and hydration.   - Treatment goal 145-155lb. Follow up 3 months, continue wegovy 2.4mg dose for now, no side effects.           2. Pre-diabetes  Assessment & Plan:  Improving with lifestyle chagnes and treatment. A1c now 5.1. Denies hypoglycemia. Continue low carb diet, regular physical activity, and maintain weight reduction of 10-15%. Continue wegovy.         3. Other hyperlipidemia  Assessment & Plan:  Lipid abnormalities are unchanged, LDL still low risk at 108.  Nutritional counseling was provided.  Lipids will be reassessed in 1 year.      4. ALT (SGPT) level raised  Assessment & Plan:  Improving with lifestyle changes, ALT now WNL.             Follow Up   Return in about 3 months (around 4/30/2024) for Next scheduled follow up.  Patient was given instructions and  counseling regarding her condition or for health maintenance advice. Please see specific information pulled into the AVS if appropriate.     Radha Lezama, NAVYA  01/30/2024

## 2024-01-30 NOTE — ASSESSMENT & PLAN NOTE
Lipid abnormalities are unchanged, LDL still low risk at 108.  Nutritional counseling was provided.  Lipids will be reassessed in 1 year.

## 2024-01-30 NOTE — ASSESSMENT & PLAN NOTE
Patient's (Body mass index is 25.18 kg/m².) indicates that they are overweight with health conditions that include hypertension, dyslipidemias, osteoarthritis, and prediabetes  . Weight is improving with treatment. BMI is is above average; BMI management plan is completed. We discussed low calorie, low carb based diet program, portion control, increasing exercise, pharmacologic options including wegovy, and an ligia-based approach such as [employer] Pal or Lose It.     I have instructed the patient to continue with pursuit of medical weight loss as a part of this program. Patient does meet criteria for use of anorectics at this time as this medication is indicated for LONG TERM use for management of obesity.     The current plan for this month includes:   - Continue current exercise efforts  - Continue to prioritize protein, fiber, and hydration.   - Treatment goal 145-155lb. Follow up 3 months, continue wegovy 2.4mg dose for now, no side effects.

## 2024-01-30 NOTE — ASSESSMENT & PLAN NOTE
Improving with lifestyle chagnes and treatment. A1c now 5.1. Denies hypoglycemia. Continue low carb diet, regular physical activity, and maintain weight reduction of 10-15%. Continue wegovy.

## 2024-04-02 ENCOUNTER — OFFICE VISIT (OUTPATIENT)
Dept: BARIATRICS/WEIGHT MGMT | Facility: CLINIC | Age: 59
End: 2024-04-02
Payer: COMMERCIAL

## 2024-04-02 VITALS
OXYGEN SATURATION: 98 % | HEART RATE: 62 BPM | DIASTOLIC BLOOD PRESSURE: 72 MMHG | HEIGHT: 66 IN | BODY MASS INDEX: 24.75 KG/M2 | SYSTOLIC BLOOD PRESSURE: 122 MMHG | WEIGHT: 154 LBS

## 2024-04-02 DIAGNOSIS — E66.8 OTHER OBESITY: Primary | ICD-10-CM

## 2024-04-02 DIAGNOSIS — K59.09 OTHER CONSTIPATION: ICD-10-CM

## 2024-04-02 DIAGNOSIS — Z71.3 WEIGHT LOSS COUNSELING, ENCOUNTER FOR: ICD-10-CM

## 2024-04-02 PROBLEM — E66.89 OTHER OBESITY: Status: ACTIVE | Noted: 2023-01-03

## 2024-04-02 PROBLEM — E66.3 OVERWEIGHT WITH BODY MASS INDEX (BMI) OF 26 TO 26.9 IN ADULT: Status: ACTIVE | Noted: 2023-01-03

## 2024-04-02 PROCEDURE — 99214 OFFICE O/P EST MOD 30 MIN: CPT | Performed by: NURSE PRACTITIONER

## 2024-04-02 RX ORDER — SEMAGLUTIDE 2.4 MG/.75ML
2.4 INJECTION, SOLUTION SUBCUTANEOUS WEEKLY
Qty: 9 ML | Refills: 0 | Status: SHIPPED | OUTPATIENT
Start: 2024-04-02

## 2024-04-02 NOTE — ASSESSMENT & PLAN NOTE
Returned since last visit. Has started collagen peptides daily for menopausal joint pain. Continue daily miralax as needed, regular physical activity, and dietary fiber.

## 2024-04-02 NOTE — PROGRESS NOTES
[facility]  [address]  [place] [address]     Office Note      Date: 2024  Patient Name: Dana Jackson  MRN: [id number]  : 1965  Subjective  Subjective     Chief Complaint  Obesity Management follow-up          Dana Jackson presents to [facility] for obesity management.   Patient is satisfied with weight loss progress. Appetite is moderately controlled. Reports no side effects of prescribed medications today. Using miralax for constipation that started. Started using collagen peptide for menopausal joint pain. The patient is taking multivitamin and is taking fish oil. The patient is using a food journal. Increased strength training. Her pants size went down this month.   The patient is exercising with a FITT score of:    Frequency Intensity Time Strength Training   []   0, none []   0 []   0 []   0   []   1 (1-2x/week) []   1 (light) []   1 (<10 min) []   1 (1x/week)   [x]   2 (3-5x/week) [x]   2 (moderate) []   2 (10-20 min) []   2 (2x/week)   []   3 (daily) []   3 (moderately hard)  []   4 (very hard) []   3 (20-30 min)  [x]   4 (>30 min) [x]   3 (3-4x/week)       Review of Systems   Constitutional:  Negative for appetite change and fatigue.   Eyes:  Negative for visual disturbance.   Cardiovascular:  Negative for chest pain and palpitations.   Gastrointestinal:  Positive for constipation. Negative for indigestion.   Neurological:  Negative for light-headedness.         Current Outpatient Medications:     Bacillus Coagulans-Inulin (Align Prebiotic-Probiotic) 5-1.25 MG-GM chewable tablet, Chew 1 tablet Daily., Disp: 30 tablet, Rfl: 0    Biotin 10 MG tablet, Take 1 tablet by mouth Daily., Disp: , Rfl:     Calcium-Magnesium-Vitamin D (CALCIUM 1200+D3 PO), As Needed., Disp: , Rfl:     estradiol (VIVELLE-DOT) 0.0375 MG/24HR patch, CHANGE PATCH TWICE PER WEEK, Disp: , Rfl:     lansoprazole (PREVACID) 15 MG  "capsule, Take 1 capsule by mouth As Needed., Disp: , Rfl:     multivitamin with minerals tablet tablet, Take 1 tablet by mouth Daily., Disp: , Rfl:     Omega-3 Fatty Acids (fish oil) 1000 MG capsule capsule, Take 1 capsule by mouth 2 (Two) Times a Day With Meals., Disp: , Rfl:     ondansetron ODT (ZOFRAN-ODT) 4 MG disintegrating tablet, Place 1 tablet on the tongue Every 8 (Eight) Hours As Needed for Nausea or Vomiting., Disp: 30 tablet, Rfl: 0    polyethylene glycol (MIRALAX) 17 g packet, Take 17 g by mouth Daily As Needed., Disp: , Rfl:     Semaglutide-Weight Management (Wegovy) 2.4 MG/0.75ML solution auto-injector, Inject 2.4 mg under the skin into the appropriate area as directed 1 (One) Time Per Week., Disp: 9 mL, Rfl: 0    Zinc 50 MG tablet, Take 1 tablet by mouth Daily., Disp: , Rfl:     Objective   Start weight: 234 pounds.    Total Loss lb/%Loss of beginning body weight (BBW): -80lb/-34.19%  Change in weight since last visit: -2    Body mass index is 24.86 kg/m².   Body composition analysis completed and showed:   Body Fat %: 33.7    Measurements (in inches)  Waist Circumference: 40      Vital Signs:   /72 (BP Location: Left arm, Patient Position: Sitting)   Pulse 62   Ht 167.6 cm (66\")   Wt 69.9 kg (154 lb)   SpO2 98%   BMI 24.86 kg/m²     Physical Exam   General appears stated age and normal appearance   HEENT PERRLA, EOM intact, and conjunctivae normal   Chest/lungs Normal rate, Regular rhythm, and Breathing is unlabored   Extremities without edema   Neuro Good historian and No focal deficit   Skin Warm, dry, intact   Psych normal behavior, normal thought content, and normal concentration     Result Review :                Assessment / Plan        Diagnoses and all orders for this visit:    1. Other obesity (Primary)  Overview:  >>OVERVIEW FOR CLASS 1 OBESITY DUE TO EXCESS CALORIES WITH SERIOUS COMORBIDITY AND BODY MASS INDEX (BMI) OF 34.0 TO 34.9 IN ADULT WRITTEN ON 4/2/2024  8:03 AM BY " SHAWN CAMACHO APRN    Start medical weight managment 1/3/2024  Weight: 234lb  Goal: 145-175lb  Wegovy start: 1/12/2023 @ 227lb  Co-morbidities: hypertension, hyperlipidemia, pre-diabetes, mood disorder, arthritis, GERD, ^ALT    Assessment & Plan:  Patient's (Body mass index is 24.86 kg/m².) is now normal.   Co-existing health conditions that include hypertension, osteoarthritis, and prediabetes  .   Weight is improving with treatment.   We discussed low calorie, low carb based diet program, portion control, management of depression/anxiety/stress to control compensatory eating, pharmacologic options including wegovy, and an ligia-based approach such as [employer] Pal or Lose It.   I have instructed the patient to continue with pursuit of medical weight loss as a part of this program. Patient does meet criteria for use of anorectics at this time as this medication is indicated for LONG TERM use for management of obesity.       - Treatment goal 145lb.       Orders:  -     Semaglutide-Weight Management (Wegovy) 2.4 MG/0.75ML solution auto-injector; Inject 2.4 mg under the skin into the appropriate area as directed 1 (One) Time Per Week.  Dispense: 9 mL; Refill: 0    2. Other constipation  Assessment & Plan:  Returned since last visit. Has started collagen peptides daily for menopausal joint pain. Continue daily miralax as needed, regular physical activity, and dietary fiber.       3. Weight loss counseling, encounter for  Assessment & Plan:  The current plan for this month includes:   - Continue current exercise efforts  - Continue to prioritize protein, fiber, and hydration.   - Dicussed mediterranean style diet which her  has started due to his heart disease from chemo. She is adding more vegetables to her diet, discussed to keep net calories around 1200 for continued weight reduction.             Follow Up   Return in about 3 months (around 7/2/2024) for Next scheduled follow up.  Patient was given  instructions and counseling regarding her condition or for health maintenance advice. Please see specific information pulled into the AVS if appropriate.     Radha Lezama, NAVYA  04/02/2024

## 2024-04-02 NOTE — ASSESSMENT & PLAN NOTE
Patient's (Body mass index is 24.86 kg/m².) is now normal.   Co-existing health conditions that include hypertension, osteoarthritis, and prediabetes  .   Weight is improving with treatment.   We discussed low calorie, low carb based diet program, portion control, management of depression/anxiety/stress to control compensatory eating, pharmacologic options including wegovy, and an ligia-based approach such as [employer] Pal or Lose It.   I have instructed the patient to continue with pursuit of medical weight loss as a part of this program. Patient does meet criteria for use of anorectics at this time as this medication is indicated for LONG TERM use for management of obesity.       - Treatment goal 145lb.

## 2024-07-02 NOTE — PROGRESS NOTES
[facility]  [address]  [place] [address]     Office Note      Date: 2024  Patient Name: Dana Jackson  MRN: [id number]  : 1965    Subjective     Chief Complaint  Obesity Management follow-up    Dana Jackson presents to [facility] for obesity management.       Patient is satisfied with weight loss progress. Appetite is moderately controlled. Reports no side effects of prescribed medications today, except mild nausea on first few days of shots. Requesting refill on Zofran. Currently taking Wegovy 2.4mg.  The patient is taking multivitamin and is taking fish oil.  The patient is using a food journal.  The patient rates current efforts as 10 out of 10. Reports alf of primary care provider, requesting referral to a [facility] care provider.     The patient is exercising with a FITT score of:    Frequency Intensity Time Strength Training   []   0, none []   0 []   0 []   0   []   1 (1-2x/week) [x]   1 (light) []   1 (<10 min) []   1 (1x/week)   []   2 (3-5x/week) []   2 (moderate) []   2 (10-20 min) []   2 (2x/week)   [x]   3 (daily) []   3 (moderately hard)  []   4 (very hard) []   3 (20-30 min)  [x]   4 (>30 min) [x]   3 (3-4x/week)     Review of Systems   Constitutional:  Negative for appetite change and fatigue.   Eyes:  Negative for visual disturbance.   Cardiovascular:  Negative for chest pain and palpitations.   Gastrointestinal:  Positive for nausea. Negative for constipation, diarrhea and indigestion.   Neurological:  Negative for light-headedness.       Objective   Start weight: 234 pounds.    Total Loss lb/%Loss of beginning body weight (BBW): -86lb/-36.8%  Change in weight since last visit: -6    Recent Weight History:   Wt Readings from Last 6 Encounters:   24 67.1 kg (148 lb)   24 69.9 kg (154 lb)   24 70.8 kg (156 lb)   23 73.9 kg (163 lb)   10/30/23 76.6 kg (168 lb  "12.8 oz)   09/07/23 80.6 kg (177 lb 9.6 oz)     Body mass index is 23.89 kg/m².   Body composition analysis completed and showed:     Measurements (in inches)  Waist Circumference: 39    Vital Signs:   /80 (BP Location: Right arm, Patient Position: Sitting)   Pulse 81   Ht 167.6 cm (66\")   Wt 67.1 kg (148 lb)   BMI 23.89 kg/m²       Physical Exam  Vitals reviewed.   Constitutional:       Appearance: She is well-developed.   Pulmonary:      Effort: Pulmonary effort is normal.   Neurological:      Mental Status: She is alert.   Psychiatric:         Attention and Perception: Attention normal.         Mood and Affect: Mood normal.         Speech: Speech normal.         Behavior: Behavior normal.        Result Review :           Assessment / Plan        Diagnoses and all orders for this visit:    1. Other obesity (Primary)  Assessment & Plan:  Patient's (Body mass index is 23.89 kg/m².) indicates that they are is now normal, but fat% at 34.1 still elevated with health conditions that include  pre-diabetes  . Weight is improving with treatment. We discussed low calorie, low carb based diet program, portion control, increasing exercise, pharmacologic options including Wegovy, and an ligia-based approach such as Biotz Pal or Lose It.   -- This is a follow-up visit and patient is down around 6 pounds since last being seen approximately 3 months ago.  --Refill of Wegovy 2.4 since as well as Zofran to use as needed for nausea related to use of GLP-1.  --Body composition was reviewed and although within normal BMI fat percentage is still mildly elevated outside healthiest range for someone her age and sex.  Final goal 145 pounds and within healthy fat range.  Focus on reaching protein intake and including resistance training while exercising.  --Primary care provider referral sent  --External labs reviewed from 1/2024. Consider repeat A1C and CMP at next office visit with continue use of GLP-1    Orders:  -     " Semaglutide-Weight Management (Wegovy) 2.4 MG/0.75ML solution auto-injector; Inject 2.4 mg under the skin into the appropriate area as directed 1 (One) Time Per Week.  Dispense: 9 mL; Refill: 0  -     Ambulatory Referral to Family Practice    2. Nausea  -     ondansetron ODT (ZOFRAN-ODT) 4 MG disintegrating tablet; Place 1 tablet on the tongue Every 8 (Eight) Hours As Needed for Nausea or Vomiting.  Dispense: 30 tablet; Refill: 0    3. Primary hypertension  -     Ambulatory Referral to Family Practice    4. Other hyperlipidemia  -     Ambulatory Referral to Family Practice        We discussed the risks, benefits, and limitations of treatments. Continue medications and OTC supplements as discussed. Patient verbalizes understanding of and agreement with management plan.     Follow Up   Return in about 4 weeks (around 8/5/2024).  Patient was given instructions and counseling regarding her condition or for health maintenance advice. Please see specific information pulled into the AVS if appropriate.     I spent 35 minutes on this date of service. This time includes time spent by me in the following activities:preparing for the visit, counseling and educating the patient/family/caregiver, ordering medications, tests, or procedures and documenting information in the medical record.    Sri Fierro, NAVYA  07/08/2024

## 2024-07-08 ENCOUNTER — OFFICE VISIT (OUTPATIENT)
Dept: BARIATRICS/WEIGHT MGMT | Facility: CLINIC | Age: 59
End: 2024-07-08
Payer: COMMERCIAL

## 2024-07-08 VITALS
WEIGHT: 148 LBS | HEIGHT: 66 IN | BODY MASS INDEX: 23.78 KG/M2 | HEART RATE: 81 BPM | SYSTOLIC BLOOD PRESSURE: 118 MMHG | DIASTOLIC BLOOD PRESSURE: 80 MMHG

## 2024-07-08 DIAGNOSIS — E66.8 OTHER OBESITY: Primary | ICD-10-CM

## 2024-07-08 DIAGNOSIS — R11.0 NAUSEA: ICD-10-CM

## 2024-07-08 DIAGNOSIS — E78.49 OTHER HYPERLIPIDEMIA: ICD-10-CM

## 2024-07-08 DIAGNOSIS — I10 PRIMARY HYPERTENSION: ICD-10-CM

## 2024-07-08 RX ORDER — SEMAGLUTIDE 2.4 MG/.75ML
2.4 INJECTION, SOLUTION SUBCUTANEOUS WEEKLY
Qty: 9 ML | Refills: 0 | Status: SHIPPED | OUTPATIENT
Start: 2024-07-08

## 2024-07-08 RX ORDER — ONDANSETRON 4 MG/1
4 TABLET, ORALLY DISINTEGRATING ORAL EVERY 8 HOURS PRN
Qty: 30 TABLET | Refills: 0 | Status: SHIPPED | OUTPATIENT
Start: 2024-07-08

## 2024-07-08 NOTE — ASSESSMENT & PLAN NOTE
Patient's (Body mass index is 23.89 kg/m².) indicates that they are is now normal, but fat% at 34.1 still elevated with health conditions that include  pre-diabetes  . Weight is improving with treatment. We discussed low calorie, low carb based diet program, portion control, increasing exercise, pharmacologic options including Wegovy, and an ligia-based approach such as LLUSTRE Pal or Lose It.   -- This is a follow-up visit and patient is down around 6 pounds since last being seen approximately 3 months ago.  --Refill of Wegovy 2.4 since as well as Zofran to use as needed for nausea related to use of GLP-1.  --Body composition was reviewed and although within normal BMI fat percentage is still mildly elevated outside healthiest range for someone her age and sex.  Final goal 145 pounds and within healthy fat range.  Focus on reaching protein intake and including resistance training while exercising.  --Primary care provider referral sent  --External labs reviewed from 1/2024. Consider repeat A1C and CMP at next office visit with continue use of GLP-1

## 2024-10-07 ENCOUNTER — OFFICE VISIT (OUTPATIENT)
Dept: BARIATRICS/WEIGHT MGMT | Facility: CLINIC | Age: 59
End: 2024-10-07
Payer: COMMERCIAL

## 2024-10-07 VITALS
HEIGHT: 66 IN | DIASTOLIC BLOOD PRESSURE: 72 MMHG | HEART RATE: 70 BPM | WEIGHT: 145.6 LBS | SYSTOLIC BLOOD PRESSURE: 106 MMHG | BODY MASS INDEX: 23.4 KG/M2

## 2024-10-07 DIAGNOSIS — E78.49 OTHER HYPERLIPIDEMIA: ICD-10-CM

## 2024-10-07 DIAGNOSIS — R73.03 PRE-DIABETES: ICD-10-CM

## 2024-10-07 DIAGNOSIS — R74.01 ALT (SGPT) LEVEL RAISED: ICD-10-CM

## 2024-10-07 DIAGNOSIS — K21.9 GASTROESOPHAGEAL REFLUX DISEASE WITHOUT ESOPHAGITIS: ICD-10-CM

## 2024-10-07 DIAGNOSIS — Z71.3 WEIGHT LOSS COUNSELING, ENCOUNTER FOR: Primary | ICD-10-CM

## 2024-10-07 DIAGNOSIS — I10 PRIMARY HYPERTENSION: ICD-10-CM

## 2024-10-07 PROCEDURE — 99214 OFFICE O/P EST MOD 30 MIN: CPT | Performed by: NURSE PRACTITIONER

## 2024-10-07 NOTE — ASSESSMENT & PLAN NOTE
Denies hypoglycemia. Continue low carb diet, regular physical activity, and weight reduction of 10-15%. Continue wegovy. Repeat A1c today.

## 2024-10-07 NOTE — PROGRESS NOTES
[facility]  [address]  [place] [address]     Office Note      Date: 10/07/2024  Patient Name: Dana Jackson  MRN: [id number]  : 1965  Subjective  Subjective     Chief Complaint  Obesity Management follow-up          Dana Jackson presents to [facility] for obesity management.   Patient is satisfied with weight loss progress. Appetite is moderately controlled. Reports no side effects of prescribed medications today. Chronic constipation is unchanged, miralax PRN. The patient is taking multivitamin and is taking fish oil.  The patient is using a food journal.    24 hour recall:  Breakfast: peach, vanilla protein shake, keto bread, coffee w/ half and half, 2 slices bunch, 1 egg   Lunch: honey ham, red pepper, cherry tomatoes, dill pickle, low fat cottage cheese  Dinner: mashed potatoes, corn on the cob, BBQ pork ribs    Calories: 1009  Carbs: 78  Fiber: 14  Protein: 105  Water Intake: 80    Monthly avg  Patrice: 994  Net carb: 49  Protein:98  Water: 75oz  The patient is exercising- walking 3 miles a day, hasn't been doing weights since staying with her mom but has an ligia that she uses, with a FITT score of:    Frequency Intensity Time Strength Training   []   0, none []   0 []   0 []   0   []   1 (1-2x/week) [x]   1 (light) []   1 (<10 min) []   1 (1x/week)   []   2 (3-5x/week) []   2 (moderate) []   2 (10-20 min) []   2 (2x/week)   [x]   3 (daily) []   3 (moderately hard)  []   4 (very hard) []   3 (20-30 min)  [x]   4 (>30 min) [x]   3 (3-4x/week)       Review of Systems   Constitutional:  Negative for appetite change and fatigue.   Eyes:  Negative for visual disturbance.   Cardiovascular:  Negative for chest pain and palpitations.   Gastrointestinal:  Negative for constipation and indigestion.   Neurological:  Negative for light-headedness.       Current Outpatient Medications:     Biotin 10 MG tablet, Take 1 tablet  "by mouth Daily., Disp: , Rfl:     Calcium-Magnesium-Vitamin D (CALCIUM 1200+D3 PO), As Needed., Disp: , Rfl:     estradiol (VIVELLE-DOT) 0.0375 MG/24HR patch, CHANGE PATCH TWICE PER WEEK, Disp: , Rfl:     multivitamin with minerals tablet tablet, Take 1 tablet by mouth Daily., Disp: , Rfl:     Omega-3 Fatty Acids (fish oil) 1000 MG capsule capsule, Take 1 capsule by mouth 2 (Two) Times a Day With Meals., Disp: , Rfl:     ondansetron ODT (ZOFRAN-ODT) 4 MG disintegrating tablet, Place 1 tablet on the tongue Every 8 (Eight) Hours As Needed for Nausea or Vomiting., Disp: 30 tablet, Rfl: 0    polyethylene glycol (MIRALAX) 17 g packet, Take 17 g by mouth Daily As Needed., Disp: , Rfl:     Semaglutide-Weight Management (Wegovy) 2.4 MG/0.75ML solution auto-injector, Inject 2.4 mg under the skin into the appropriate area as directed 1 (One) Time Per Week., Disp: 9 mL, Rfl: 0    Zinc 50 MG tablet, Take 1 tablet by mouth Daily., Disp: , Rfl:     lansoprazole (PREVACID) 15 MG capsule, Take 1 capsule by mouth As Needed., Disp: , Rfl:     Objective   Start weight: 234 pounds.    Total Loss lb/%Loss of beginning body weight (BBW): -88.4lb/-37.77%  Change in weight since last visit: -2.4    Body mass index is 23.5 kg/m².   Body composition analysis completed and showed:   Body Fat %: 34.6    Measurements (in inches)  Waist Circumference: 36    Vital Signs:   /72 (BP Location: Left arm, Patient Position: Sitting)   Pulse 70   Ht 167.6 cm (66\")   Wt 66 kg (145 lb 9.6 oz)   BMI 23.50 kg/m²     No LMP recorded. Patient has had a hysterectomy.    Physical Exam   General appears stated age and normal appearance   HEENT PERRLA, EOM intact, and conjunctivae normal   Chest/lungs Normal rate, Regular rhythm, and Breathing is unlabored   Extremities without edema   Neuro Good historian and No focal deficit   Skin Warm, dry, intact   Psych normal behavior, normal thought content, and normal concentration     Result Review :         "        Assessment / Plan        Diagnoses and all orders for this visit:    1. Weight loss counseling, encounter for (Primary)  Assessment & Plan:  BMI now normal. History of class 2 obesity.   The current plan includes:   - Continue to prioritize protein, fiber, and hydration.   - Continue daily food journal and weight monitoring  - Encouraged to bring back strength/resistance training now VS waiting until she's back home in about 2 weeks.   - Calorie goal 1200/day, protein 90-100g/day.            2. Pre-diabetes  Overview:  1/12/23: start wegovy  12/2022 A1c 5.7      Assessment & Plan:  Denies hypoglycemia. Continue low carb diet, regular physical activity, and weight reduction of 10-15%. Continue wegovy. Repeat A1c today.       Orders:  -     Comprehensive Metabolic Panel  -     Hemoglobin A1c    3. Primary hypertension  Assessment & Plan:  Hypertension is stable and controlled, meds discontinued 6 mos ago.  Maintain healthy weight  Regular aerobic exercise.  Ambulatory blood pressure monitoring.  Blood pressure will be reassessed in 3 months.    Orders:  -     Comprehensive Metabolic Panel  -     Hemoglobin A1c  -     Lipid Panel  -     TSH    4. Other hyperlipidemia  Overview:  12/2022 . Start psyllium husk fiber.     Assessment & Plan:  Cont healthy lifestyle choices. Repeat labs today.     Orders:  -     Lipid Panel    5. ALT (SGPT) level raised  Overview:  12/2022 ^ALT 48.     Orders:  -     Comprehensive Metabolic Panel    6. Gastroesophageal reflux disease without esophagitis  Assessment & Plan:  Improved with weight reduction and lifestyle changes. Using lansoprazole PRN.             Follow Up   Return in about 3 months (around 1/7/2025) for Next scheduled follow up.  Patient was given instructions and counseling regarding her condition or for health maintenance advice. Please see specific information pulled into the AVS if appropriate.     Radha Lezama, APRN  10/07/2024

## 2024-10-07 NOTE — ASSESSMENT & PLAN NOTE
BMI now normal. History of class 2 obesity.   The current plan includes:   - Continue to prioritize protein, fiber, and hydration.   - Continue daily food journal and weight monitoring  - Encouraged to bring back strength/resistance training now VS waiting until she's back home in about 2 weeks.   - Calorie goal 1200/day, protein 90-100g/day.

## 2024-10-07 NOTE — ASSESSMENT & PLAN NOTE
Hypertension is stable and controlled, meds discontinued 6 mos ago.  Maintain healthy weight  Regular aerobic exercise.  Ambulatory blood pressure monitoring.  Blood pressure will be reassessed in 3 months.

## 2024-10-08 LAB
ALBUMIN SERPL-MCNC: 4.3 G/DL (ref 3.5–5.2)
ALBUMIN/GLOB SERPL: 2.4 G/DL
ALP SERPL-CCNC: 68 U/L (ref 39–117)
ALT SERPL-CCNC: 13 U/L (ref 1–33)
AST SERPL-CCNC: 15 U/L (ref 1–32)
BILIRUB SERPL-MCNC: 0.6 MG/DL (ref 0–1.2)
BUN SERPL-MCNC: 18 MG/DL (ref 6–20)
BUN/CREAT SERPL: 28.6 (ref 7–25)
CALCIUM SERPL-MCNC: 8.9 MG/DL (ref 8.6–10.5)
CHLORIDE SERPL-SCNC: 106 MMOL/L (ref 98–107)
CHOLEST SERPL-MCNC: 185 MG/DL (ref 0–200)
CO2 SERPL-SCNC: 26 MMOL/L (ref 22–29)
CREAT SERPL-MCNC: 0.63 MG/DL (ref 0.57–1)
EGFRCR SERPLBLD CKD-EPI 2021: 103.6 ML/MIN/1.73
GLOBULIN SER CALC-MCNC: 1.8 GM/DL
GLUCOSE SERPL-MCNC: 83 MG/DL (ref 65–99)
HBA1C MFR BLD: 4.7 % (ref 4.8–5.6)
HDLC SERPL-MCNC: 53 MG/DL (ref 40–60)
LDLC SERPL CALC-MCNC: 122 MG/DL (ref 0–100)
POTASSIUM SERPL-SCNC: 4.4 MMOL/L (ref 3.5–5.2)
PROT SERPL-MCNC: 6.1 G/DL (ref 6–8.5)
SODIUM SERPL-SCNC: 140 MMOL/L (ref 136–145)
TRIGL SERPL-MCNC: 53 MG/DL (ref 0–150)
TSH SERPL DL<=0.005 MIU/L-ACNC: 1.07 UIU/ML (ref 0.27–4.2)
VLDLC SERPL CALC-MCNC: 10 MG/DL (ref 5–40)

## 2024-11-05 ENCOUNTER — OFFICE VISIT (OUTPATIENT)
Dept: FAMILY MEDICINE CLINIC | Facility: CLINIC | Age: 59
End: 2024-11-05
Payer: COMMERCIAL

## 2024-11-05 VITALS
HEART RATE: 67 BPM | DIASTOLIC BLOOD PRESSURE: 80 MMHG | OXYGEN SATURATION: 99 % | BODY MASS INDEX: 23.78 KG/M2 | HEIGHT: 66 IN | SYSTOLIC BLOOD PRESSURE: 100 MMHG | WEIGHT: 148 LBS

## 2024-11-05 DIAGNOSIS — Z76.89 ESTABLISHING CARE WITH NEW DOCTOR, ENCOUNTER FOR: Primary | ICD-10-CM

## 2024-11-05 DIAGNOSIS — K59.09 OTHER CONSTIPATION: ICD-10-CM

## 2024-11-05 DIAGNOSIS — Z12.11 ENCOUNTER FOR SCREENING FOR MALIGNANT NEOPLASM OF COLON: ICD-10-CM

## 2024-11-05 PROBLEM — Z00.00 ANNUAL PHYSICAL EXAM: Status: ACTIVE | Noted: 2024-04-02

## 2024-11-05 PROCEDURE — 99396 PREV VISIT EST AGE 40-64: CPT

## 2024-11-05 NOTE — ASSESSMENT & PLAN NOTE
Most recent hemoglobin A1c 4.7%  Wegovy prescribed by  weight management clinic  Decreased need for Zofran, previously as needed r/t Wegovy  Discussed with patient: result indicates average blood sugars improved with diet and lifestyle changes    Plan:  Continue current diet and lifestyle changes   Re-check HgbA1c at annual wellness exam unless otherwise indicated

## 2024-11-05 NOTE — PROGRESS NOTES
Establish Care/Wellness Exam Note      Date: 2024   Patient Name: Dana Jackson  : 1965   MRN: [id number]     Chief Complaint   Patient presents with    [facility]    Annual Exam       History of Present Illness: Dana Jackson is a 59 y.o. female who is here today for non-Medicare annual health maintenance physical.  Patient concerns today: Establishing care with new provider and consolidating care to PCP including topical hormone therapy.   She recently completed annual wellness with nurse practitioner she is established with at [facility] weight management.  She denies any new illness or injury since her wellness visit. Denies falls, unexplained weight change, fevers, chills, or other constitutional symptoms and has no additional questions or concens at this time.    Hormone patches  Complete hysterectomy - endomyolosis  Annual Bi-manual exam only - Donaldo McdonaldGykatelyn - Due in .       Subjective     Review of Systems   Constitutional:  Negative for activity change, fatigue, fever and unexpected weight gain.   HENT: Negative.     Eyes: Negative.    Respiratory:  Negative for chest tightness and shortness of breath.    Cardiovascular:  Negative for chest pain.   Gastrointestinal: Negative.    Genitourinary: Negative.    Neurological: Negative.    Psychiatric/Behavioral: Negative.         Past Medical History, Social History, Family History and Care Team were all reviewed with patient and updated as appropriate.       Current Outpatient Medications:     Biotin 10 MG tablet, Take 1 tablet by mouth Daily., Disp: , Rfl:     Calcium-Magnesium-Vitamin D (CALCIUM 1200+D3 PO), As Needed., Disp: , Rfl:     estradiol (VIVELLE-DOT) 0.0375 MG/24HR patch, CHANGE PATCH TWICE PER WEEK, Disp: , Rfl:     lansoprazole (PREVACID) 15 MG capsule, Take 1 capsule by mouth As Needed., Disp: , Rfl:     multivitamin with minerals tablet tablet, Take 1 tablet by mouth Daily., Disp: , Rfl:     Omega-3  Fatty Acids (fish oil) 1000 MG capsule capsule, Take 1 capsule by mouth 2 (Two) Times a Day With Meals., Disp: , Rfl:     ondansetron ODT (ZOFRAN-ODT) 4 MG disintegrating tablet, Place 1 tablet on the tongue Every 8 (Eight) Hours As Needed for Nausea or Vomiting., Disp: 30 tablet, Rfl: 0    polyethylene glycol (MIRALAX) 17 g packet, Take 17 g by mouth Daily As Needed., Disp: , Rfl:     Semaglutide-Weight Management (Wegovy) 2.4 MG/0.75ML solution auto-injector, Inject 2.4 mg under the skin into the appropriate area as directed 1 (One) Time Per Week., Disp: 9 mL, Rfl: 0    Zinc 50 MG tablet, Take 1 tablet by mouth Daily., Disp: , Rfl:     Allergies   Allergen Reactions    Oxycodone GI Intolerance    Tetracyclines & Related Hives    Penicillins Rash       Health Maintenance Summary            Overdue - COLORECTAL CANCER SCREENING (View Topic Details) Never done      No completion, postpone, or frequency change history exists for this topic.              Overdue - ZOSTER VACCINE (1 of 2) Never done      No completion, postpone, or frequency change history exists for this topic.              Overdue - HEPATITIS C SCREENING (Once) Never done      No completion, postpone, or frequency change history exists for this topic.              Overdue - PAP SMEAR (Every 3 Years) Never done      No completion, postpone, or frequency change history exists for this topic.              Overdue - COVID-19 Vaccine (7 - 2024-25 season) Overdue since 9/1/2024 09/25/2023  Imm Admin: COVID-19 (PFIZER) 12YRS+ (COMIRNATY)    09/10/2022  Imm Admin: COVID-19 (PFIZER) BIVALENT 12+YRS    04/25/2022  Imm Admin: Covid-19 (Pfizer) Gray Cap Monovalent    09/29/2021  Imm Admin: COVID-19 (PFIZER) Purple Cap Monovalent    02/05/2021  Imm Admin: COVID-19 (PFIZER) Purple Cap Monovalent    Only the first 5 history entries have been loaded, but more history exists.              ANNUAL PHYSICAL (Yearly) Next due on 10/7/2025      10/07/2024  Done               LIPID PANEL (Yearly) Next due on 10/7/2025      10/07/2024  Lipid Panel    01/03/2023  Lipid Panel              MAMMOGRAM (Every 2 Years) Next due on 1/5/2026 01/05/2024  Mammo Screening Bilateral With CAD    12/16/2022  Mammo Screening Bilateral With CAD              TDAP/TD VACCINES (4 - Td or Tdap) Next due on 7/8/2031 07/08/2021  Imm Admin: Tdap    08/20/2018  Imm Admin: Tdap    07/17/1979  Imm Admin: Td, Not Adsorbed              Orthopox/Monkeypox (Series Information) Completed      10/30/1969  Imm Admin: Smallpox              INFLUENZA VACCINE  Completed      09/28/2024  Imm Admin: Fluzone  >6mos    09/28/2024  Imm Admin: Influenza Split Preservative Free ID    09/25/2023  Imm Admin: Influenza Injectable Mdck Pf Quad    09/10/2022  Imm Admin: Fluzone (or Fluarix & Flulaval for VFC) >6mos    09/29/2021  Imm Admin: Influenza Injectable Mdck Pf Quad    Only the first 5 history entries have been loaded, but more history exists.              Pneumococcal Vaccine 0-64 (Series Information) Aged Out      No completion, postpone, or frequency change history exists for this topic.                    No orders of the defined types were placed in this encounter.    Screening exams up-to-date?  Hep C (Age 18-79 once):  No - will check with next lab draw  HIV (Age 15-65 once) : No  A1c:  Yes   Hemoglobin A1C   Date Value Ref Range Status   10/07/2024 4.70 (L) 4.80 - 5.60 % Final     Comment:     Hemoglobin A1C Ranges:  Increased Risk for Diabetes  5.7% to 6.4%  Diabetes                     >= 6.5%  Diabetic Goal                < 7.0%       Lipid panel:  Yes   Ophthalmologist: Yes  Dentist: Yes  Colorectal Screening:   No   Last Completed Colonoscopy       This patient has no relevant Health Maintenance data.          CT for Smoker (Age 50-80, 20pk yr within last 15 years):  No  Bone Density/DEXA (high risk): N/A - Dr. Jarrett did DEXA   PSA (Over age 50, C Level Recommendation):  N/A  US Aorta (For male  "smokers, age 65):  N/A    The 10-year ASCVD risk score (Holland VILLARREAL, et al., 2019) is: 1.4%    Values used to calculate the score:      Age: 59 years      Sex: Female      Is Non- : No      Diabetic: No      Tobacco smoker: No      Systolic Blood Pressure: 100 mmHg      Is BP treated: No      HDL Cholesterol: 53 mg/dL      Total Cholesterol: 185 mg/dL    Tobacco Use: Medium Risk (11/5/2024)    Patient History     Smoking Tobacco Use: Former     Smokeless Tobacco Use: Never     Passive Exposure: Not on file       Social History     Substance and Sexual Activity   Alcohol Use Yes    Alcohol/week: 2.0 standard drinks of alcohol    Types: 1 Glasses of wine, 1 Drinks containing 0.5 oz of alcohol per week    Comment: per month only with meal        Social History     Substance and Sexual Activity   Drug Use Never        Diet/Physical activity: Healthy diet and active lifestyle    Sexual health:   Sexually active: Yes  Contraception use:No  Attempting pregnancy:No    PHQ-2 Depression Screening  Little interest or pleasure in doing things? Not at all   Feeling down, depressed, or hopeless? Not at all   PHQ-2 Total Score 0        Measures     Advanced Care Planning:   Patient has an advance directive (not in EMR), copy requested.    Smoking Cessation:   less than 3 minutes spent counseling NA -pt non-smoker     Objective     General appearance: alert, appears stated age, and cooperative     Vitals:    11/05/24 0844   BP: 100/80   BP Location: Right arm   Patient Position: Sitting   Cuff Size: Adult   Pulse: 67   SpO2: 99%   Weight: 67.1 kg (148 lb)   Height: 167.6 cm (66\")   PainSc: 0-No pain     Body mass index is 23.89 kg/m².      POCT Results (if applicable):   Results for orders placed or performed in visit on 10/07/24   Comprehensive Metabolic Panel    Collection Time: 10/07/24  8:45 AM    Specimen: Blood   Result Value Ref Range    Glucose 83 65 - 99 mg/dL    BUN 18 6 - 20 mg/dL    Creatinine 0.63 " 0.57 - 1.00 mg/dL    EGFR Result 103.6 >60.0 mL/min/1.73    BUN/Creatinine Ratio 28.6 (H) 7.0 - 25.0    Sodium 140 136 - 145 mmol/L    Potassium 4.4 3.5 - 5.2 mmol/L    Chloride 106 98 - 107 mmol/L    Total CO2 26.0 22.0 - 29.0 mmol/L    Calcium 8.9 8.6 - 10.5 mg/dL    Total Protein 6.1 6.0 - 8.5 g/dL    Albumin 4.3 3.5 - 5.2 g/dL    Globulin 1.8 gm/dL    A/G Ratio 2.4 g/dL    Total Bilirubin 0.6 0.0 - 1.2 mg/dL    Alkaline Phosphatase 68 39 - 117 U/L    AST (SGOT) 15 1 - 32 U/L    ALT (SGPT) 13 1 - 33 U/L   Hemoglobin A1c    Collection Time: 10/07/24  8:45 AM    Specimen: Blood   Result Value Ref Range    Hemoglobin A1C 4.70 (L) 4.80 - 5.60 %   Lipid Panel    Collection Time: 10/07/24  8:45 AM    Specimen: Blood   Result Value Ref Range    Total Cholesterol 185 0 - 200 mg/dL    Triglycerides 53 0 - 150 mg/dL    HDL Cholesterol 53 40 - 60 mg/dL    VLDL Cholesterol Patrice 10 5 - 40 mg/dL    LDL Chol Calc (NIH) 122 (H) 0 - 100 mg/dL   TSH    Collection Time: 10/07/24  8:45 AM    Specimen: Blood   Result Value Ref Range    TSH 1.070 0.270 - 4.200 uIU/mL         Assessment / Plan      Assessment/Plan:   Diagnoses and all orders for this visit:    1. Establishing care with new doctor, encounter for (Primary)  Assessment & Plan:  Dana Jackson  here for establish care visit and wellness exam  Annual wellness exam completed 10/7/2024 with [facility] weight management provider  States main concerns: Establishing care with PCP and medication refills  Available medical records reviewed and discussed with her today.   Appropriate diagnostic testing completed 10/7/2024, reviewed today during office visit and questions addressed   Patient declined need for medication refills at this time   Agrees to follow-up in 1 year for annual wellness exam unless otherwise indicated.      Plan:  Address care gaps today - referral placed for colonoscopy  Up-to-date on immunizations  Current appointment with OB/GYN - January 2025,   Jarrett - pelvic and bi-manual exam-patient s/p complete hysterectomy.       2. Other constipation  Assessment & Plan:  Patient Instructions:  Recommend bowel cleanse with Miralax, which is over the counter. Dissolve 5 capfuls into 32 ounces of sugar-free gatorade/powerade and drink 8 ounces every 30 minutes until gone. Follow this with regular use of Miralax, titrating to achieve/maintain soft/formed bowel movements. Miralax is safe to use long-term. You can also repeat the bowel cleanse as needed.      Recommend 25-30 grams of fiber daily. May use Fibercon tablets to supplement as needed. Fibercon is less likely to cause gas/bloating.      Drink 64-80 ounces of water daily, with ideally half of that containing electrolytes (sugar free gatorade/powerade, electrolyte tablets)     Exercise is helpful for maintaining healthy bowel habits.        3. Encounter for screening for malignant neoplasm of colon  -     Ambulatory Referral to Gastroenterology       Healthcare Maintenance:  Counseling provided based on age appropriate USPSTF guidelines.  BMI is within normal parameters. No other follow-up for BMI required.      Recommendations:  use calcium 1 gram daily with Vit D, continue current medications, continue current healthy lifestyle patterns, and return for routine annual checkups    Dana Jackson voices understanding and acceptance of this advice and will call back with any further questions or concerns. AVS with preventive healthcare tips printed for patient.     Vaccine Counseling:  “Discussed risks/benefits to vaccination, reviewed components of the vaccine, discussed VIS, discussed informed consent, informed consent obtained. Patient/Parent was allowed to accept or refuse vaccine. Questions answered to satisfactory state of patient/Parent. We reviewed typical age appropriate and seasonally appropriate vaccinations. Reviewed immunization history and updated state vaccination form as needed. Patient was  counseled on Zoster    Follow Up:   Return in about 1 year (around 11/5/2025) for Annual physical, Patient will need labs before next visit please.    Patient Education:   Reviewed medications, potential side effects and signs and symptoms to report.   Discussed risk vs benefits of treatment plan with patient including medications, labs, and imaging.    Patient education materials attached to AVS and reviewed with patient during office visit today.   Addressed questions and concerns during visit. Patient verbalized understanding and agrees with tx plan.   Instructed to call the office with any questions and report to ER with any life-threatening symptoms.    NAVYA Arias (Lib[facility]) PC [facility]  [address] [facility] [id number] [phone]    NOTE TO PATIENT:   The 21st Century Cures Act makes medical notes like these available to patients in the interest of transparency. However, be advised this is a medical document. It is intended as peer to peer communication. It is written in medical language and may contain abbreviations or verbiage that are unfamiliar. It may appear blunt or direct. Medical documents are intended to carry relevant information, facts as evident, and the clinical opinion of the practitioner.     EMR Dragon/Transcription disclaimer: Much of this encounter note is an electronic transcription of spoken language to printed text. Electronic transcription of spoken language may permit erroneous, or at times, nonsensical words or phrases to be inadvertently transcribed. Although I have reviewed the note for such errors, some may still exist.

## 2024-11-05 NOTE — ASSESSMENT & PLAN NOTE
Dana Banegas Manuel  here for establish care visit and wellness exam  Annual wellness exam completed 10/7/2024 with [facility] weight management provider  States main concerns: Establishing care with PCP and medication refills  Available medical records reviewed and discussed with her today.   Appropriate diagnostic testing completed 10/7/2024, reviewed today during office visit and questions addressed   Patient declined need for medication refills at this time   Agrees to follow-up in 1 year for annual wellness exam unless otherwise indicated.      Plan:  Address care gaps today - referral placed for colonoscopy  Up-to-date on immunizations  Current appointment with OB/GYN - January 2025, Dr. Jarrett - pelvic and bi-manual exam-patient s/p complete hysterectomy.

## 2024-11-06 PROBLEM — Z76.89 ESTABLISHING CARE WITH NEW DOCTOR, ENCOUNTER FOR: Status: ACTIVE | Noted: 2024-04-02

## 2024-11-06 NOTE — ASSESSMENT & PLAN NOTE
Patient Instructions:  Recommend bowel cleanse with Miralax, which is over the counter. Dissolve 5 capfuls into 32 ounces of sugar-free gatorade/powerade and drink 8 ounces every 30 minutes until gone. Follow this with regular use of Miralax, titrating to achieve/maintain soft/formed bowel movements. Miralax is safe to use long-term. You can also repeat the bowel cleanse as needed.      Recommend 25-30 grams of fiber daily. May use Fibercon tablets to supplement as needed. Fibercon is less likely to cause gas/bloating.      Drink 64-80 ounces of water daily, with ideally half of that containing electrolytes (sugar free gatorade/powerade, electrolyte tablets)     Exercise is helpful for maintaining healthy bowel habits.

## 2024-11-06 NOTE — PATIENT INSTRUCTIONS
Bowel Cleanse:      Recommend bowel cleanse with Miralax, which is over the counter. Dissolve 5 capfuls into 32 ounces of sugar-free gatorade/powerade and drink 8 ounces every 30 minutes until gone. Follow this with regular use of Miralax, titrating to achieve/maintain soft/formed bowel movements. Miralax is safe to use long-term. You can also repeat the bowel cleanse as needed.      Recommend 25-30 grams of fiber daily. May use Fibercon tablets to supplement as needed. Fibercon is less likely to cause gas/bloating.      Drink 64-80 ounces of water daily, with ideally half of that containing electrolytes (sugar free gatorade/powerade, electrolyte tablets)     Exercise is helpful for maintaining healthy bowel habits.

## 2024-11-19 ENCOUNTER — TRANSCRIBE ORDERS (OUTPATIENT)
Dept: CT IMAGING | Facility: CLINIC | Age: 59
End: 2024-11-19
Payer: COMMERCIAL

## 2024-11-19 DIAGNOSIS — Z12.31 ENCOUNTER FOR SCREENING MAMMOGRAM FOR MALIGNANT NEOPLASM OF BREAST: Primary | ICD-10-CM

## 2024-12-26 RX ORDER — SODIUM, POTASSIUM,MAG SULFATES 17.5-3.13G
1 SOLUTION, RECONSTITUTED, ORAL ORAL TAKE AS DIRECTED
Qty: 354 ML | Refills: 0 | Status: SHIPPED | OUTPATIENT
Start: 2024-12-26

## 2024-12-26 RX ORDER — SEMAGLUTIDE 2.4 MG/.75ML
2.4 INJECTION, SOLUTION SUBCUTANEOUS WEEKLY
Qty: 9 ML | Refills: 0 | Status: SHIPPED | OUTPATIENT
Start: 2024-12-26

## 2025-01-06 ENCOUNTER — TELEMEDICINE (OUTPATIENT)
Dept: BARIATRICS/WEIGHT MGMT | Facility: CLINIC | Age: 60
End: 2025-01-06
Payer: COMMERCIAL

## 2025-01-06 VITALS
HEART RATE: 82 BPM | BODY MASS INDEX: 23.29 KG/M2 | DIASTOLIC BLOOD PRESSURE: 72 MMHG | WEIGHT: 144.3 LBS | OXYGEN SATURATION: 99 % | SYSTOLIC BLOOD PRESSURE: 106 MMHG

## 2025-01-06 DIAGNOSIS — Z76.89 ENCOUNTER FOR WEIGHT MANAGEMENT: Primary | ICD-10-CM

## 2025-01-06 DIAGNOSIS — E66.9 OBESITY WITHOUT SERIOUS COMORBIDITY, UNSPECIFIED CLASS, UNSPECIFIED OBESITY TYPE: ICD-10-CM

## 2025-01-06 DIAGNOSIS — R73.03 PRE-DIABETES: ICD-10-CM

## 2025-01-06 NOTE — ASSESSMENT & PLAN NOTE
Continue healthy lifestyle choices.   Keep protein >100g/day, water >75oz.day, net carbs <100g/day  Cont wegovy  Continue to weigh daily  Continue at least 150 minutes a week of moderate intensity exercise with 2 resistance training sessions weekly.

## 2025-01-06 NOTE — ASSESSMENT & PLAN NOTE
A1c low at 4.7 due to wegovy, Denies hypoglycemia, reviewed S/S. Continue low carb diet, regular physical activity, and weight reduction of >15%. Con't wegovy at current dose at this time. Repeat in 6 mos (may).

## 2025-01-06 NOTE — PROGRESS NOTES
Office Note      Date: 2025  Patient Name: Dana Jackson  MRN: [id number]  : 1965     Mode of Visit: Video  Location of patient: -HOME-  Location of provider: +HOME+  You have chosen to receive care through a telehealth visit.  The patient has signed the video visit consent form.  The visit included audio and video interaction. No technical issues occurred during this visit.    Subjective  Subjective     Chief Complaint  Obesity Management follow-up    Subjective          Dana Jackson presents to [facility]NA[facility] via telehealth for obesity management.     Patient is satisfied with weight loss progress. Appetite is well controlled. Reports no side effects of prescribed medications today. She is still keeping a food journal. Keeping protein 100-120g/day. Carbs usually around 50g/day. Did get up in the 70g more with the holidays. Water intake is 80-100g/day. Harder in the winter but drinks more hot liquids. Eats the same things at the same time, doesn't eat past 7pm.     The patient is exercising, still struggling to incorporate strength training. Has home equipment.     Recently established care with a new PCP.     Review of Systems   Constitutional:  Negative for appetite change and fatigue.   Eyes:  Negative for visual disturbance.   Cardiovascular:  Negative for chest pain and palpitations.   Gastrointestinal:  Negative for constipation and indigestion.   Endocrine: Negative for polydipsia, polyphagia and polyuria.   Neurological:  Negative for light-headedness.   Psychiatric/Behavioral:  Negative for sleep disturbance.          Objective   Start weight: 234 pounds.    Total weight loss: -90 pounds/-38.4%  Change in weight since last visit: -1lb    Body mass index is 23.29 kg/m².   Measurements (in inches) Waist Circumference: 36   /72   Pulse 82   Wt 65.5 kg (144 lb 4.8 oz)   SpO2 99%   BMI 23.29 kg/m²       Result Review :   The following  data was reviewed by: NAVYA Varma on 01/06/2025:  Office Visit on 10/07/2024   Component Date Value Ref Range Status    Glucose 10/07/2024 83  65 - 99 mg/dL Final    BUN 10/07/2024 18  6 - 20 mg/dL Final    Creatinine 10/07/2024 0.63  0.57 - 1.00 mg/dL Final    EGFR Result 10/07/2024 103.6  >60.0 mL/min/1.73 Final    Comment: GFR Normal >60  Chronic Kidney Disease <60  Kidney Failure <15      BUN/Creatinine Ratio 10/07/2024 28.6 (H)  7.0 - 25.0 Final    Sodium 10/07/2024 140  136 - 145 mmol/L Final    Potassium 10/07/2024 4.4  3.5 - 5.2 mmol/L Final    Chloride 10/07/2024 106  98 - 107 mmol/L Final    Total CO2 10/07/2024 26.0  22.0 - 29.0 mmol/L Final    Calcium 10/07/2024 8.9  8.6 - 10.5 mg/dL Final    Total Protein 10/07/2024 6.1  6.0 - 8.5 g/dL Final    Albumin 10/07/2024 4.3  3.5 - 5.2 g/dL Final    Globulin 10/07/2024 1.8  gm/dL Final    A/G Ratio 10/07/2024 2.4  g/dL Final    Total Bilirubin 10/07/2024 0.6  0.0 - 1.2 mg/dL Final    Alkaline Phosphatase 10/07/2024 68  39 - 117 U/L Final    AST (SGOT) 10/07/2024 15  1 - 32 U/L Final    ALT (SGPT) 10/07/2024 13  1 - 33 U/L Final    Hemoglobin A1C 10/07/2024 4.70 (L)  4.80 - 5.60 % Final    Comment: Hemoglobin A1C Ranges:  Increased Risk for Diabetes  5.7% to 6.4%  Diabetes                     >= 6.5%  Diabetic Goal                < 7.0%      Total Cholesterol 10/07/2024 185  0 - 200 mg/dL Final    Comment: Cholesterol Reference Ranges  (U.S. Department of Health and Human Services ATP III  Classifications)  Desirable          <200 mg/dL  Borderline High    200-239 mg/dL  High Risk          >240 mg/dL  Triglyceride Reference Ranges  (U.S. Department of Health and Human Services ATP III  Classifications)  Normal           <150 mg/dL  Borderline High  150-199 mg/dL  High             200-499 mg/dL  Very High        >500 mg/dL  HDL Reference Ranges  (U.S. Department of Health and Human Services ATP III  Classifications)  Low     <40 mg/dl (major risk factor  for CHD)  High    >60 mg/dl ('negative' risk factor for CHD)  LDL Reference Ranges  (U.S. Department of Health and Human Services ATP III  Classifications)  Optimal          <100 mg/dL  Near Optimal     100-129 mg/dL  Borderline High  130-159 mg/dL  High             160-189 mg/dL  Very High        >189 mg/dL      Triglycerides 10/07/2024 53  0 - 150 mg/dL Final    HDL Cholesterol 10/07/2024 53  40 - 60 mg/dL Final    VLDL Cholesterol Patrice 10/07/2024 10  5 - 40 mg/dL Final    LDL Chol Calc (NIH) 10/07/2024 122 (H)  0 - 100 mg/dL Final    TSH 10/07/2024 1.070  0.270 - 4.200 uIU/mL Final                   Assessment and Plan    Diagnoses and all orders for this visit:    1. Encounter for weight management (Primary)  Assessment & Plan:  Continue healthy lifestyle choices.   Keep protein >100g/day, water >75oz.day, net carbs <100g/day  Cont wegovy  Continue to weigh daily  Continue at least 150 minutes a week of moderate intensity exercise with 2 resistance training sessions weekly.         2. Obesity without serious comorbidity, unspecified class, unspecified obesity type  Overview:  >>OVERVIEW FOR CLASS 1 OBESITY DUE TO EXCESS CALORIES WITH SERIOUS COMORBIDITY AND BODY MASS INDEX (BMI) OF 34.0 TO 34.9 IN ADULT WRITTEN ON 4/2/2024  8:03 AM BY SHAWN CAMACHO APRN    Start medical weight managment 1/3/2024  Weight: 234lb  Goal: 145-175lb  Wegovy start: 1/12/2023 @ 227lb  Co-morbidities: hypertension, hyperlipidemia, pre-diabetes, mood disorder, arthritis, GERD, ^ALT    Assessment & Plan:  In remission.   Patient's (Body mass index is 23.29 kg/m².)  is now normal.   History of class 2 obesity with coexisting HTN, HLD, preDM, OA, GERD   Weight is improving with treatment.  We discussed low calorie, low carb based diet program, portion control, increasing exercise, and pharmacologic options including wegovy .   - Adjust exercise as discussed- refer for exercise education- needs strength training program for home since  she's not motivated to go to the gym in the winter.     Orders:  -     Ambulatory Referral to Exercise Education    3. Pre-diabetes  Overview:  1/12/23: start wegovy  12/2022 A1c 5.7      Assessment & Plan:  A1c low at 4.7 due to wegovy, Denies hypoglycemia, reviewed S/S. Continue low carb diet, regular physical activity, and weight reduction of >15%. Con't wegovy at current dose at this time. Repeat in 6 mos (may).               Follow Up   Return in about 3 months (around 4/6/2025) for Next scheduled follow up.  Patient was given instructions and counseling regarding her condition or for health maintenance advice. Please see specific information pulled into the AVS if appropriate.     NAVYA Linares

## 2025-01-06 NOTE — ASSESSMENT & PLAN NOTE
In remission.   Patient's (Body mass index is 23.29 kg/m².)  is now normal.   History of class 2 obesity with coexisting HTN, HLD, preDM, OA, GERD   Weight is improving with treatment.  We discussed low calorie, low carb based diet program, portion control, increasing exercise, and pharmacologic options including wegovy .   - Adjust exercise as discussed- refer for exercise education- needs strength training program for home since she's not motivated to go to the gym in the winter.

## 2025-01-07 ENCOUNTER — PRIOR AUTHORIZATION (OUTPATIENT)
Dept: BARIATRICS/WEIGHT MGMT | Facility: CLINIC | Age: 60
End: 2025-01-07
Payer: COMMERCIAL

## 2025-01-07 NOTE — TELEPHONE ENCOUNTER
PA for Wegovy. APPROVED    FLAVIO LONDON (Lira: [id number])  Wegovy 2.4MG/0.75ML auto-injectors  Form  [facility] Electronic PA Form (2017 NCPDP)  Created  3 minutes ago  Sent to Plan  less than a minute ago  Determination  Wait for Questions  [facility] typically responds with questions in less than 15 minutes, but may take up to 24 hours.    Message from Plan  Your PA has been resolved, no additional PA is required. For further inquiries please contact the number on the back of the member prescription card. (Message [phone])

## 2025-01-08 ENCOUNTER — OUTSIDE FACILITY SERVICE (OUTPATIENT)
Dept: GASTROENTEROLOGY | Facility: CLINIC | Age: 60
End: 2025-01-08
Payer: COMMERCIAL

## 2025-01-08 PROCEDURE — 45378 DIAGNOSTIC COLONOSCOPY: CPT | Performed by: INTERNAL MEDICINE

## 2025-01-15 ENCOUNTER — OFFICE VISIT (OUTPATIENT)
Dept: OTHER | Facility: HOSPITAL | Age: 60
End: 2025-01-15

## 2025-01-15 NOTE — PROGRESS NOTES
Exercise Education Note - Initial Visit    Patient: Dana Jackson       Date: 1/15/2025    Patient was seen in office for initial exercise education consult. Approximately 60 min was spent preparing, discussing concerns, counseling, programing, and documenting patient encounter.     For/Concerns: pt has been working with weight mgt and has maintained her weight loss for 8 months, she consistently walks and is ready to add resistance exercise to her routine to build muscle.     Current Exercise Abilities/Experience: walks 30-45 min daily    Equipment / Facilities Available:at home, mini bands, dumbbells, kettle bells, ball, step, treadmill    Barriers to Exercise: occasionally travels, unsure of what to do at home    Summary of Education / Achievement Strategies: pt shares the understanding she needs to build muscle, additional education was provided on why it is important and how to effectively incorporate into her routine. She has identified after work as the best time to strength train at home. She was provided with a written program that has video demos for a 4 day / week training split using beginner friendly movements.  Programs was printed and emailed to patient. We reviewed the program and how to progress each week. She had no additional questions and scheduled follow up for 5 weeks.       Bharathi Melendez  1/15/2025  11:20 EST

## 2025-02-17 ENCOUNTER — OFFICE VISIT (OUTPATIENT)
Dept: OTHER | Facility: HOSPITAL | Age: 60
End: 2025-02-17

## 2025-02-17 NOTE — PROGRESS NOTES
Exercise Education Note - Follow Up    Patient: Dana Jackson       Date: 2/17/2025     Patient was seen via telehealth for follow up exercise education consult. Approximately 30 min was spent preparing, counseling, goal setting and discussing next steps.     Goal(s) Achievement Status:   Patient has been successful following the prescribed program of resistance exercise 4 days per week following an upper / lower split of 3 sets of 10 reps. She has been using 5 lbs for upper body, and 10 lbs for lower body. She reports feeling better, a little more tone and pants feel a little looser. Scale has stayed about the same. She is also walking 3 days per week and doing pranay 2 days per week.     Barriers Identified or Additional Concerns: None, pt reports no issues or concerns    Next Steps: pt will gradually increase the resistance used over the next several weeks, following the same routine. At next visit we will discuss changing the exercises. Follow up scheduled for 2 months.        Bharathi Melendez  2/17/2025  12:03 EST

## 2025-02-20 DIAGNOSIS — E28.39 ESTROGEN DEFICIENCY: Primary | ICD-10-CM

## 2025-02-20 RX ORDER — ESTRADIOL 0.04 MG/D
1 PATCH, EXTENDED RELEASE TRANSDERMAL 2 TIMES WEEKLY
Qty: 8 EACH | Refills: 5 | Status: SHIPPED | OUTPATIENT
Start: 2025-02-20

## 2025-02-20 NOTE — TELEPHONE ENCOUNTER
Message sent to patient, refill of hormone patches sent to [facility] mail order pharmacy.

## 2025-03-20 ENCOUNTER — PRIOR AUTHORIZATION (OUTPATIENT)
Dept: BARIATRICS/WEIGHT MGMT | Facility: CLINIC | Age: 60
End: 2025-03-20
Payer: COMMERCIAL

## 2025-03-20 NOTE — TELEPHONE ENCOUNTER
Dana Jackson (Key: [id number])  Wegovy 2.4MG/0.75ML auto-injectors  Form  Formerly [facility] Electronic PA Form (2017 NCPDP)    PA APPROVED: 03/20/2025-03/20/2026

## 2025-04-06 NOTE — PROGRESS NOTES
[facility]  [address]  [place] [address]     Office Note      Date: 2025  Patient Name: Dana Jackson  MRN: [id number]  : 1965    Subjective     Chief Complaint  Obesity Management follow-up    Dana Jackson presents to [facility] for obesity management.       Patient is satisfied with weight loss progress. Appetite is poorly controlled. Reports no side effects of prescribed medications today. The patient is taking multivitamin and is taking fish oil.  The patient is using a food journal.  The patient rates current efforts as 10 out of 10. She is currently taking Wegovy 2.4mg and tolerating well. She is seeing the exercise educator.     The patient is exercising with a FITT score of:    Frequency Intensity Time Strength Training   []   0, none []   0 []   0 []   0   []   1 (1-2x/week) []   1 (light) []   1 (<10 min) []   1 (1x/week)   [x]   2 (3-5x/week) [x]   2 (moderate) []   2 (10-20 min) []   2 (2x/week)   []   3 (daily) []   3 (moderately hard)  []   4 (very hard) []   3 (20-30 min)  [x]   4 (>30 min) [x]   3 (3-4x/week)     Review of Systems   Constitutional:  Negative for appetite change and fatigue.   Eyes:  Negative for visual disturbance.   Cardiovascular:  Negative for chest pain and palpitations.   Gastrointestinal:  Negative for constipation and indigestion.   Neurological:  Negative for light-headedness.       Objective   Start weight: 234 pounds.    Total Loss lb/%Loss of beginning body weight (BBW): -98.8 lb/-38.38%  Change in weight since last visit: -0.6 lb    Recent Weight History:   Wt Readings from Last 6 Encounters:   25 65.4 kg (144 lb 3.2 oz)   25 65.5 kg (144 lb 4.8 oz)   24 67.1 kg (148 lb)   10/07/24 66 kg (145 lb 9.6 oz)   24 67.1 kg (148 lb)   24 69.9 kg (154 lb)       Body mass index is 23.27 kg/m².   Body composition analysis completed and showed:  "  Body Fat %: 35.7%    Measurements (in inches)  Waist Circumference: 36    Vital Signs:   /78   Pulse 78   Resp 18   Ht 167.6 cm (66\")   Wt 65.4 kg (144 lb 3.2 oz)   SpO2 98%   BMI 23.27 kg/m²       Physical Exam  Vitals reviewed.   Constitutional:       Appearance: She is well-developed.   Pulmonary:      Effort: Pulmonary effort is normal.   Neurological:      Mental Status: She is alert.   Psychiatric:         Attention and Perception: Attention normal.         Mood and Affect: Mood normal.         Speech: Speech normal.         Behavior: Behavior normal.        Result Review :     Common labs          10/7/2024    08:45   Common Labs   Glucose 83    BUN 18    Creatinine 0.63    Sodium 140    Potassium 4.4    Chloride 106    Calcium 8.9    Albumin 4.3    Total Bilirubin 0.6    Alkaline Phosphatase 68    AST (SGOT) 15    ALT (SGPT) 13    Total Cholesterol 185    Triglycerides 53    HDL Cholesterol 53    LDL Cholesterol  122    Hemoglobin A1C 4.70          Assessment / Plan        Diagnoses and all orders for this visit:    1. Encounter for weight management (Primary)  Assessment & Plan:  This is a follow-up visit patient is approximately the same weight as she was around 3 months ago.  Currently in maintenance phase, at weight goal.  Currently taking Wegovy 2.4 mg and tolerating well.  Was very mildly low A1c at last drawl and repeating that today as well as other GLP-1 labs . Blood count ordered as well as she reports a history of low white blood cell and requesting an updated CBC. If abnormal advised to follow up with primary care or managing provider.  He has been seeing exercise educator and working on keeping her mindful movement up.  Keep up this great effort.    Orders:  -     Comprehensive Metabolic Panel  -     Hemoglobin A1c  -     Lipid Panel  -     CBC (No Diff)  -     Semaglutide-Weight Management (Wegovy) 2.4 MG/0.75ML solution auto-injector; Inject 0.75 mL under the skin into the " appropriate area as directed 1 (One) Time Per Week.  Dispense: 9 mL; Refill: 0    2. Encounter for long-term current use of medication  -     Comprehensive Metabolic Panel  -     Hemoglobin A1c  -     Lipid Panel  -     CBC (No Diff)  -     Semaglutide-Weight Management (Wegovy) 2.4 MG/0.75ML solution auto-injector; Inject 0.75 mL under the skin into the appropriate area as directed 1 (One) Time Per Week.  Dispense: 9 mL; Refill: 0      We discussed the risks, benefits, and limitations of treatments. Continue medications and OTC supplements as discussed. Patient verbalizes understanding of and agreement with management plan.     Follow Up   Return in about 4 weeks (around 5/5/2025).  Patient was given instructions and counseling regarding her condition or for health maintenance advice. Please see specific information pulled into the AVS if appropriate.     I spent 40 minutes on this date of service. This time includes time spent by me in the following activities:preparing for the visit, counseling and educating the patient/family/caregiver, ordering medications, tests, or procedures and documenting information in the medical record.    Sri Fierro, APRN  04/07/2025

## 2025-04-07 ENCOUNTER — OFFICE VISIT (OUTPATIENT)
Dept: BARIATRICS/WEIGHT MGMT | Facility: CLINIC | Age: 60
End: 2025-04-07
Payer: COMMERCIAL

## 2025-04-07 VITALS
OXYGEN SATURATION: 98 % | SYSTOLIC BLOOD PRESSURE: 120 MMHG | WEIGHT: 144.2 LBS | HEART RATE: 78 BPM | RESPIRATION RATE: 18 BRPM | HEIGHT: 66 IN | DIASTOLIC BLOOD PRESSURE: 78 MMHG | BODY MASS INDEX: 23.18 KG/M2

## 2025-04-07 DIAGNOSIS — Z79.899 ENCOUNTER FOR LONG-TERM CURRENT USE OF MEDICATION: ICD-10-CM

## 2025-04-07 DIAGNOSIS — Z76.89 ENCOUNTER FOR WEIGHT MANAGEMENT: Primary | ICD-10-CM

## 2025-04-07 PROCEDURE — 99215 OFFICE O/P EST HI 40 MIN: CPT | Performed by: NURSE PRACTITIONER

## 2025-04-07 RX ORDER — SEMAGLUTIDE 2.4 MG/.75ML
2.4 INJECTION, SOLUTION SUBCUTANEOUS WEEKLY
Qty: 9 ML | Refills: 0 | Status: SHIPPED | OUTPATIENT
Start: 2025-04-07

## 2025-04-07 NOTE — ASSESSMENT & PLAN NOTE
This is a follow-up visit patient is approximately the same weight as she was around 3 months ago.  Currently in maintenance phase, at weight goal.  Currently taking Wegovy 2.4 mg and tolerating well.  Was very mildly low A1c at last drawl and repeating that today as well as other GLP-1 labs . Blood count ordered as well as she reports a history of low white blood cell and requesting an updated CBC. If abnormal advised to follow up with primary care or managing provider.  He has been seeing exercise educator and working on keeping her mindful movement up.  Keep up this great effort.

## 2025-04-08 LAB
ALBUMIN SERPL-MCNC: 4.3 G/DL (ref 3.5–5.2)
ALBUMIN/GLOB SERPL: 1.8 G/DL
ALP SERPL-CCNC: 73 U/L (ref 39–117)
ALT SERPL-CCNC: 21 U/L (ref 1–33)
AST SERPL-CCNC: 18 U/L (ref 1–32)
BILIRUB SERPL-MCNC: 0.7 MG/DL (ref 0–1.2)
BUN SERPL-MCNC: 21 MG/DL (ref 6–20)
BUN/CREAT SERPL: 35 (ref 7–25)
CALCIUM SERPL-MCNC: 9.3 MG/DL (ref 8.6–10.5)
CHLORIDE SERPL-SCNC: 107 MMOL/L (ref 98–107)
CHOLEST SERPL-MCNC: 183 MG/DL (ref 0–200)
CO2 SERPL-SCNC: 25.7 MMOL/L (ref 22–29)
CREAT SERPL-MCNC: 0.6 MG/DL (ref 0.57–1)
EGFRCR SERPLBLD CKD-EPI 2021: 104.8 ML/MIN/1.73
ERYTHROCYTE [DISTWIDTH] IN BLOOD BY AUTOMATED COUNT: 12.2 % (ref 12.3–15.4)
GLOBULIN SER CALC-MCNC: 2.4 GM/DL
GLUCOSE SERPL-MCNC: 88 MG/DL (ref 65–99)
HBA1C MFR BLD: 4.6 % (ref 4.8–5.6)
HCT VFR BLD AUTO: 43.3 % (ref 34–46.6)
HDLC SERPL-MCNC: 60 MG/DL (ref 40–60)
HGB BLD-MCNC: 14.1 G/DL (ref 12–15.9)
LDLC SERPL CALC-MCNC: 110 MG/DL (ref 0–100)
MCH RBC QN AUTO: 31.1 PG (ref 26.6–33)
MCHC RBC AUTO-ENTMCNC: 32.6 G/DL (ref 31.5–35.7)
MCV RBC AUTO: 95.6 FL (ref 79–97)
PLATELET # BLD AUTO: 252 10*3/MM3 (ref 140–450)
POTASSIUM SERPL-SCNC: 4.8 MMOL/L (ref 3.5–5.2)
PROT SERPL-MCNC: 6.7 G/DL (ref 6–8.5)
RBC # BLD AUTO: 4.53 10*6/MM3 (ref 3.77–5.28)
SODIUM SERPL-SCNC: 141 MMOL/L (ref 136–145)
TRIGL SERPL-MCNC: 67 MG/DL (ref 0–150)
VLDLC SERPL CALC-MCNC: 13 MG/DL (ref 5–40)
WBC # BLD AUTO: 2.92 10*3/MM3 (ref 3.4–10.8)

## 2025-04-27 ENCOUNTER — PATIENT MESSAGE (OUTPATIENT)
Dept: BARIATRICS/WEIGHT MGMT | Facility: CLINIC | Age: 60
End: 2025-04-27
Payer: COMMERCIAL

## 2025-04-27 DIAGNOSIS — R73.03 PRE-DIABETES: Primary | ICD-10-CM

## 2025-04-28 RX ORDER — CYANOCOBALAMIN 1000 UG/ML
1000 INJECTION, SOLUTION INTRAMUSCULAR; SUBCUTANEOUS
Qty: 1 ML | Refills: 5 | Status: SHIPPED | OUTPATIENT
Start: 2025-04-28 | End: 2025-10-13

## 2025-04-28 RX ORDER — NEEDLES, DISPOSABLE 27GX1/2"
1 NEEDLE, DISPOSABLE MISCELLANEOUS
Qty: 4 EACH | Refills: 5 | Status: SHIPPED | OUTPATIENT
Start: 2025-04-28

## 2025-06-16 DIAGNOSIS — Z76.89 ENCOUNTER FOR WEIGHT MANAGEMENT: ICD-10-CM

## 2025-06-16 DIAGNOSIS — Z79.899 ENCOUNTER FOR LONG-TERM CURRENT USE OF MEDICATION: ICD-10-CM

## 2025-06-16 DIAGNOSIS — R73.03 PRE-DIABETES: ICD-10-CM

## 2025-06-16 RX ORDER — NEEDLES, DISPOSABLE 27GX1/2"
1 NEEDLE, DISPOSABLE MISCELLANEOUS
Qty: 4 EACH | Refills: 5 | Status: SHIPPED | OUTPATIENT
Start: 2025-06-16

## 2025-06-16 RX ORDER — CYANOCOBALAMIN 1000 UG/ML
1000 INJECTION, SOLUTION INTRAMUSCULAR; SUBCUTANEOUS
Qty: 1 ML | Refills: 5 | Status: SHIPPED | OUTPATIENT
Start: 2025-06-16 | End: 2025-12-01

## 2025-06-17 RX ORDER — SEMAGLUTIDE 2.4 MG/.75ML
2.4 INJECTION, SOLUTION SUBCUTANEOUS WEEKLY
Qty: 9 ML | Refills: 0 | Status: SHIPPED | OUTPATIENT
Start: 2025-06-17

## 2025-07-23 ENCOUNTER — TELEMEDICINE (OUTPATIENT)
Dept: BARIATRICS/WEIGHT MGMT | Facility: CLINIC | Age: 60
End: 2025-07-23
Payer: COMMERCIAL

## 2025-07-23 VITALS
WEIGHT: 144 LBS | HEART RATE: 76 BPM | HEIGHT: 66 IN | DIASTOLIC BLOOD PRESSURE: 70 MMHG | SYSTOLIC BLOOD PRESSURE: 102 MMHG | BODY MASS INDEX: 23.14 KG/M2

## 2025-07-23 DIAGNOSIS — R73.03 PRE-DIABETES: ICD-10-CM

## 2025-07-23 DIAGNOSIS — E66.9 OBESITY WITHOUT SERIOUS COMORBIDITY, UNSPECIFIED CLASS, UNSPECIFIED OBESITY TYPE: Primary | ICD-10-CM

## 2025-07-23 PROCEDURE — 99214 OFFICE O/P EST MOD 30 MIN: CPT | Performed by: NURSE PRACTITIONER

## 2025-07-23 NOTE — ASSESSMENT & PLAN NOTE
Stable. A1c is 4.6. Denies hypoglycemia. Continue low carb diet, regular physical activity, and maintain weight reduction of 10-15%. Con't wegovy.

## 2025-07-23 NOTE — ASSESSMENT & PLAN NOTE
"In remission.   Patient's (Body mass index is 23.24 kg/m².)  is now normal.   Waist circumference in normal at 35\".  History of class 2 obesity with coexisting HTN, HLD, preDM, OA, GERD   Weight is improving with treatment. She is in maintenance, has decreased weight by 38%.  We discussed low calorie, low carb based diet program, portion control, continue exellent exercise, and pharmacologic options including wegovy.     "
